# Patient Record
Sex: FEMALE | Race: BLACK OR AFRICAN AMERICAN | NOT HISPANIC OR LATINO
[De-identification: names, ages, dates, MRNs, and addresses within clinical notes are randomized per-mention and may not be internally consistent; named-entity substitution may affect disease eponyms.]

---

## 2021-04-04 ENCOUNTER — TRANSCRIPTION ENCOUNTER (OUTPATIENT)
Age: 36
End: 2021-04-04

## 2022-05-06 ENCOUNTER — TRANSCRIPTION ENCOUNTER (OUTPATIENT)
Age: 37
End: 2022-05-06

## 2022-05-07 ENCOUNTER — INPATIENT (INPATIENT)
Facility: HOSPITAL | Age: 37
LOS: 3 days | Discharge: HOME | End: 2022-05-11
Attending: OBSTETRICS & GYNECOLOGY | Admitting: OBSTETRICS & GYNECOLOGY
Payer: COMMERCIAL

## 2022-05-07 VITALS — OXYGEN SATURATION: 100 %

## 2022-05-07 DIAGNOSIS — Z34.80 ENCOUNTER FOR SUPERVISION OF OTHER NORMAL PREGNANCY, UNSPECIFIED TRIMESTER: ICD-10-CM

## 2022-05-07 DIAGNOSIS — Z3A.00 WEEKS OF GESTATION OF PREGNANCY NOT SPECIFIED: ICD-10-CM

## 2022-05-07 DIAGNOSIS — O26.899 OTHER SPECIFIED PREGNANCY RELATED CONDITIONS, UNSPECIFIED TRIMESTER: ICD-10-CM

## 2022-05-07 DIAGNOSIS — Z98.89 OTHER SPECIFIED POSTPROCEDURAL STATES: Chronic | ICD-10-CM

## 2022-05-07 DIAGNOSIS — Z98.890 OTHER SPECIFIED POSTPROCEDURAL STATES: Chronic | ICD-10-CM

## 2022-05-07 LAB
ANISOCYTOSIS BLD QL: SIGNIFICANT CHANGE UP
ANISOCYTOSIS BLD QL: SLIGHT — SIGNIFICANT CHANGE UP
BASOPHILS # BLD AUTO: 0 K/UL — SIGNIFICANT CHANGE UP (ref 0–0.2)
BASOPHILS # BLD AUTO: 0.07 K/UL — SIGNIFICANT CHANGE UP (ref 0–0.2)
BASOPHILS NFR BLD AUTO: 0 % — SIGNIFICANT CHANGE UP (ref 0–2)
BASOPHILS NFR BLD AUTO: 0.9 % — SIGNIFICANT CHANGE UP (ref 0–2)
COVID-19 SPIKE DOMAIN AB INTERP: POSITIVE
COVID-19 SPIKE DOMAIN ANTIBODY RESULT: >250 U/ML — HIGH
DACRYOCYTES BLD QL SMEAR: SLIGHT — SIGNIFICANT CHANGE UP
DACRYOCYTES BLD QL SMEAR: SLIGHT — SIGNIFICANT CHANGE UP
ELLIPTOCYTES BLD QL SMEAR: SLIGHT — SIGNIFICANT CHANGE UP
EOSINOPHIL # BLD AUTO: 0 K/UL — SIGNIFICANT CHANGE UP (ref 0–0.5)
EOSINOPHIL # BLD AUTO: 0.13 K/UL — SIGNIFICANT CHANGE UP (ref 0–0.5)
EOSINOPHIL NFR BLD AUTO: 0 % — SIGNIFICANT CHANGE UP (ref 0–6)
EOSINOPHIL NFR BLD AUTO: 1.7 % — SIGNIFICANT CHANGE UP (ref 0–6)
HCT VFR BLD CALC: 24.7 % — LOW (ref 34.5–45)
HCT VFR BLD CALC: 26.3 % — LOW (ref 34.5–45)
HGB BLD-MCNC: 7.2 G/DL — LOW (ref 11.5–15.5)
HGB BLD-MCNC: 8 G/DL — LOW (ref 11.5–15.5)
HYPOCHROMIA BLD QL: SLIGHT — SIGNIFICANT CHANGE UP
HYPOCHROMIA BLD QL: SLIGHT — SIGNIFICANT CHANGE UP
LYMPHOCYTES # BLD AUTO: 0.47 K/UL — LOW (ref 1–3.3)
LYMPHOCYTES # BLD AUTO: 0.8 K/UL — LOW (ref 1–3.3)
LYMPHOCYTES # BLD AUTO: 10.4 % — LOW (ref 13–44)
LYMPHOCYTES # BLD AUTO: 2.6 % — LOW (ref 13–44)
MACROCYTES BLD QL: SLIGHT — SIGNIFICANT CHANGE UP
MANUAL SMEAR VERIFICATION: SIGNIFICANT CHANGE UP
MANUAL SMEAR VERIFICATION: SIGNIFICANT CHANGE UP
MCHC RBC-ENTMCNC: 20.6 PG — LOW (ref 27–34)
MCHC RBC-ENTMCNC: 21.3 PG — LOW (ref 27–34)
MCHC RBC-ENTMCNC: 29.1 GM/DL — LOW (ref 32–36)
MCHC RBC-ENTMCNC: 30.4 GM/DL — LOW (ref 32–36)
MCV RBC AUTO: 70.1 FL — LOW (ref 80–100)
MCV RBC AUTO: 70.6 FL — LOW (ref 80–100)
MICROCYTES BLD QL: SIGNIFICANT CHANGE UP
MICROCYTES BLD QL: SIGNIFICANT CHANGE UP
MONOCYTES # BLD AUTO: 0.34 K/UL — SIGNIFICANT CHANGE UP (ref 0–0.9)
MONOCYTES # BLD AUTO: 0.8 K/UL — SIGNIFICANT CHANGE UP (ref 0–0.9)
MONOCYTES NFR BLD AUTO: 4.4 % — SIGNIFICANT CHANGE UP (ref 2–14)
MONOCYTES NFR BLD AUTO: 4.4 % — SIGNIFICANT CHANGE UP (ref 2–14)
MYELOCYTES NFR BLD: 1.7 % — HIGH (ref 0–0)
NEUTROPHILS # BLD AUTO: 16.81 K/UL — HIGH (ref 1.8–7.4)
NEUTROPHILS # BLD AUTO: 6.14 K/UL — SIGNIFICANT CHANGE UP (ref 1.8–7.4)
NEUTROPHILS NFR BLD AUTO: 80 % — HIGH (ref 43–77)
NEUTROPHILS NFR BLD AUTO: 90.3 % — HIGH (ref 43–77)
NEUTS BAND # BLD: 2.7 % — SIGNIFICANT CHANGE UP (ref 0–8)
NRBC # BLD: 1 /100 — HIGH (ref 0–0)
OVALOCYTES BLD QL SMEAR: SLIGHT — SIGNIFICANT CHANGE UP
PLAT MORPH BLD: ABNORMAL
PLAT MORPH BLD: NORMAL — SIGNIFICANT CHANGE UP
PLATELET # BLD AUTO: 136 K/UL — LOW (ref 150–400)
PLATELET # BLD AUTO: 142 K/UL — LOW (ref 150–400)
POIKILOCYTOSIS BLD QL AUTO: SLIGHT — SIGNIFICANT CHANGE UP
POLYCHROMASIA BLD QL SMEAR: SLIGHT — SIGNIFICANT CHANGE UP
POLYCHROMASIA BLD QL SMEAR: SLIGHT — SIGNIFICANT CHANGE UP
RBC # BLD: 3.5 M/UL — LOW (ref 3.8–5.2)
RBC # BLD: 3.75 M/UL — LOW (ref 3.8–5.2)
RBC # FLD: 17.2 % — HIGH (ref 10.3–14.5)
RBC # FLD: 17.5 % — HIGH (ref 10.3–14.5)
RBC BLD AUTO: ABNORMAL
RBC BLD AUTO: ABNORMAL
SARS-COV-2 IGG+IGM SERPL QL IA: >250 U/ML — HIGH
SARS-COV-2 IGG+IGM SERPL QL IA: POSITIVE
SARS-COV-2 RNA SPEC QL NAA+PROBE: SIGNIFICANT CHANGE UP
T PALLIDUM AB TITR SER: NEGATIVE — SIGNIFICANT CHANGE UP
TARGETS BLD QL SMEAR: SLIGHT — SIGNIFICANT CHANGE UP
VARIANT LYMPHS # BLD: 0.9 % — SIGNIFICANT CHANGE UP (ref 0–6)
WBC # BLD: 18.08 K/UL — HIGH (ref 3.8–10.5)
WBC # BLD: 7.68 K/UL — SIGNIFICANT CHANGE UP (ref 3.8–10.5)
WBC # FLD AUTO: 18.08 K/UL — HIGH (ref 3.8–10.5)
WBC # FLD AUTO: 7.68 K/UL — SIGNIFICANT CHANGE UP (ref 3.8–10.5)

## 2022-05-07 PROCEDURE — 88307 TISSUE EXAM BY PATHOLOGIST: CPT | Mod: 26

## 2022-05-07 PROCEDURE — 88302 TISSUE EXAM BY PATHOLOGIST: CPT | Mod: 26

## 2022-05-07 DEVICE — SURGICEL POWDER 3 GRAMS: Type: IMPLANTABLE DEVICE | Status: FUNCTIONAL

## 2022-05-07 RX ORDER — OXYTOCIN 10 UNIT/ML
4 VIAL (ML) INJECTION
Qty: 30 | Refills: 0 | Status: DISCONTINUED | OUTPATIENT
Start: 2022-05-07 | End: 2022-05-07

## 2022-05-07 RX ORDER — OXYCODONE HYDROCHLORIDE 5 MG/1
5 TABLET ORAL ONCE
Refills: 0 | Status: DISCONTINUED | OUTPATIENT
Start: 2022-05-07 | End: 2022-05-11

## 2022-05-07 RX ORDER — OXYTOCIN 10 UNIT/ML
333.33 VIAL (ML) INJECTION
Qty: 20 | Refills: 0 | Status: DISCONTINUED | OUTPATIENT
Start: 2022-05-07 | End: 2022-05-11

## 2022-05-07 RX ORDER — LANOLIN
1 OINTMENT (GRAM) TOPICAL EVERY 6 HOURS
Refills: 0 | Status: DISCONTINUED | OUTPATIENT
Start: 2022-05-07 | End: 2022-05-11

## 2022-05-07 RX ORDER — SIMETHICONE 80 MG/1
80 TABLET, CHEWABLE ORAL EVERY 4 HOURS
Refills: 0 | Status: DISCONTINUED | OUTPATIENT
Start: 2022-05-07 | End: 2022-05-11

## 2022-05-07 RX ORDER — TRANEXAMIC ACID 100 MG/ML
1000 INJECTION, SOLUTION INTRAVENOUS ONCE
Refills: 0 | Status: COMPLETED | OUTPATIENT
Start: 2022-05-07 | End: 2022-05-07

## 2022-05-07 RX ORDER — SODIUM CHLORIDE 9 MG/ML
500 INJECTION INTRAMUSCULAR; INTRAVENOUS; SUBCUTANEOUS ONCE
Refills: 0 | Status: DISCONTINUED | OUTPATIENT
Start: 2022-05-07 | End: 2022-05-11

## 2022-05-07 RX ORDER — ONDANSETRON 8 MG/1
4 TABLET, FILM COATED ORAL EVERY 6 HOURS
Refills: 0 | Status: DISCONTINUED | OUTPATIENT
Start: 2022-05-07 | End: 2022-05-11

## 2022-05-07 RX ORDER — SODIUM CHLORIDE 9 MG/ML
1000 INJECTION, SOLUTION INTRAVENOUS
Refills: 0 | Status: DISCONTINUED | OUTPATIENT
Start: 2022-05-07 | End: 2022-05-07

## 2022-05-07 RX ORDER — DEXAMETHASONE 0.5 MG/5ML
4 ELIXIR ORAL EVERY 6 HOURS
Refills: 0 | Status: DISCONTINUED | OUTPATIENT
Start: 2022-05-07 | End: 2022-05-11

## 2022-05-07 RX ORDER — NALOXONE HYDROCHLORIDE 4 MG/.1ML
0.1 SPRAY NASAL
Refills: 0 | Status: DISCONTINUED | OUTPATIENT
Start: 2022-05-07 | End: 2022-05-11

## 2022-05-07 RX ORDER — HEPARIN SODIUM 5000 [USP'U]/ML
5000 INJECTION INTRAVENOUS; SUBCUTANEOUS EVERY 12 HOURS
Refills: 0 | Status: DISCONTINUED | OUTPATIENT
Start: 2022-05-07 | End: 2022-05-11

## 2022-05-07 RX ORDER — MORPHINE SULFATE 50 MG/1
2 CAPSULE, EXTENDED RELEASE ORAL ONCE
Refills: 0 | Status: DISCONTINUED | OUTPATIENT
Start: 2022-05-07 | End: 2022-05-08

## 2022-05-07 RX ORDER — SODIUM CHLORIDE 9 MG/ML
1000 INJECTION INTRAMUSCULAR; INTRAVENOUS; SUBCUTANEOUS
Refills: 0 | Status: DISCONTINUED | OUTPATIENT
Start: 2022-05-07 | End: 2022-05-11

## 2022-05-07 RX ORDER — ACETAMINOPHEN 500 MG
1000 TABLET ORAL EVERY 6 HOURS
Refills: 0 | Status: COMPLETED | OUTPATIENT
Start: 2022-05-07 | End: 2022-05-08

## 2022-05-07 RX ORDER — ACETAMINOPHEN 500 MG
975 TABLET ORAL EVERY 6 HOURS
Refills: 0 | Status: COMPLETED | OUTPATIENT
Start: 2022-05-07 | End: 2023-04-05

## 2022-05-07 RX ORDER — TETANUS TOXOID, REDUCED DIPHTHERIA TOXOID AND ACELLULAR PERTUSSIS VACCINE, ADSORBED 5; 2.5; 8; 8; 2.5 [IU]/.5ML; [IU]/.5ML; UG/.5ML; UG/.5ML; UG/.5ML
0.5 SUSPENSION INTRAMUSCULAR ONCE
Refills: 0 | Status: DISCONTINUED | OUTPATIENT
Start: 2022-05-07 | End: 2022-05-11

## 2022-05-07 RX ORDER — CITRIC ACID/SODIUM CITRATE 300-500 MG
15 SOLUTION, ORAL ORAL EVERY 6 HOURS
Refills: 0 | Status: DISCONTINUED | OUTPATIENT
Start: 2022-05-07 | End: 2022-05-07

## 2022-05-07 RX ORDER — OXYCODONE HYDROCHLORIDE 5 MG/1
5 TABLET ORAL
Refills: 0 | Status: DISCONTINUED | OUTPATIENT
Start: 2022-05-07 | End: 2022-05-08

## 2022-05-07 RX ORDER — DIPHENHYDRAMINE HCL 50 MG
25 CAPSULE ORAL EVERY 6 HOURS
Refills: 0 | Status: DISCONTINUED | OUTPATIENT
Start: 2022-05-07 | End: 2022-05-11

## 2022-05-07 RX ORDER — OXYCODONE HYDROCHLORIDE 5 MG/1
5 TABLET ORAL ONCE
Refills: 0 | Status: DISCONTINUED | OUTPATIENT
Start: 2022-05-07 | End: 2022-05-08

## 2022-05-07 RX ORDER — SODIUM CHLORIDE 9 MG/ML
1000 INJECTION, SOLUTION INTRAVENOUS
Refills: 0 | Status: DISCONTINUED | OUTPATIENT
Start: 2022-05-07 | End: 2022-05-11

## 2022-05-07 RX ORDER — OXYCODONE HYDROCHLORIDE 5 MG/1
5 TABLET ORAL
Refills: 0 | Status: COMPLETED | OUTPATIENT
Start: 2022-05-07 | End: 2022-05-14

## 2022-05-07 RX ORDER — MAGNESIUM HYDROXIDE 400 MG/1
30 TABLET, CHEWABLE ORAL
Refills: 0 | Status: DISCONTINUED | OUTPATIENT
Start: 2022-05-07 | End: 2022-05-11

## 2022-05-07 RX ADMIN — OXYCODONE HYDROCHLORIDE 5 MILLIGRAM(S): 5 TABLET ORAL at 17:28

## 2022-05-07 RX ADMIN — Medication 400 MILLIGRAM(S): at 14:18

## 2022-05-07 RX ADMIN — Medication 1000 MILLIUNIT(S)/MIN: at 14:32

## 2022-05-07 RX ADMIN — SODIUM CHLORIDE 125 MILLILITER(S): 9 INJECTION, SOLUTION INTRAVENOUS at 07:15

## 2022-05-07 RX ADMIN — TRANEXAMIC ACID 220 MILLIGRAM(S): 100 INJECTION, SOLUTION INTRAVENOUS at 13:45

## 2022-05-07 RX ADMIN — Medication 4 MILLIUNIT(S)/MIN: at 08:55

## 2022-05-07 RX ADMIN — SODIUM CHLORIDE 125 MILLILITER(S): 9 INJECTION, SOLUTION INTRAVENOUS at 05:59

## 2022-05-07 RX ADMIN — ONDANSETRON 4 MILLIGRAM(S): 8 TABLET, FILM COATED ORAL at 23:10

## 2022-05-07 RX ADMIN — Medication 15 MILLILITER(S): at 06:50

## 2022-05-07 NOTE — OB RN PREOPERATIVE CHECKLIST - NS_PREOPCHKPREADMCARBDRINK_OBGYN_ALL_OB
Reviewed recent EKG 7/9/19 with Dr. Estrada along with cardiac clearance from Dr. Ellis 6/3/19. No new orders needed.   No

## 2022-05-07 NOTE — OB PROVIDER TRIAGE NOTE - HISTORY OF PRESENT ILLNESS
HPI: Pt is a 36yo   presenting for possible labor. Patient states that she started feeling contractions at 1AM, now occurring every 3 minutes. She denies loss of fluid or vaginal bleeding. Endorses good fetal movement. Denies any complications with this pregnancy. GBS negative. EFW 5lb3oz 1 week ago.     OBHx:  -  term C/S (unknown indication), birth weight 8lb6oz  - 2016 term , birth weight 8lb1oz  Gyn Hx: asymptomatic fibroids. Denies ovarian cysts, STDs, abnormal paps.   PMH: none  SHx: none other than C/S  Psych: no h/o depression/anxiety  Social: no tobacco, alcohol, drugs in pregnancy   Medications: none  Allergies: aspirin (anaphylaxis)  Will Accept blood transfusion? Yes    Vital Signs Last 24 Hrs  T(C): 37.2 (07 May 2022 04:35), Max: 37.2 (07 May 2022 04:25)  T(F): 99 (07 May 2022 04:35), Max: 99 (07 May 2022 04:35)  HR: 80 (07 May 2022 04:53) (76 - 101)  BP: 115/66 (07 May 2022 04:35) (115/66 - 115/66)  BP(mean): --  RR: 18 (07 May 2022 04:35) (18 - 18)  SpO2: 100% (07 May 2022 04:53) (100% - 100%)    Physical Exam:  Gen: NAD, AOx3  CV: RR  Resp: unlabored respirations  Abd: soft, NT, ND    A/P: Pt is a 36yo  presenting with labor. No prenatal issues. GBS negative.  1. Admit to LND. Routine Labs. IVF  2. Expectant Management  3. Fetus: Cat 1 tracing, Vertex.  4. Pain: IV pain meds/epidural PRN

## 2022-05-07 NOTE — OB RN DELIVERY SUMMARY - NS_SEPSISRSKCALC_OBGYN_ALL_OB_FT
EOS calculated successfully. EOS Risk Factor: 0.5/1000 live births ([facility] national incidence); GA=37w4d; Temp=99; ROM=5.667; GBS='Negative'; Antibiotics='No antibiotics or any antibiotics < 2 hrs prior to birth'

## 2022-05-07 NOTE — OB PROVIDER LABOR PROGRESS NOTE - ASSESSMENT
36 yo  @37+4 in labor for TOLAC    - SVE /-2  - FHT Cat 1, reassuring, ctm  - ISE placed  - c/w pitocin    d/w Dr. Jennifer Farrell, PGY1
38yo  @ 37w 4d  admitted in labor for TOLAC  - fetal status - pt with cat 2 NST, pitocin discontinued, change position, continue amnioinfusion  - GBS neg  - Labor management - pt still 5 cm- no cervical change, Pitocin discontinued secondary to FHT  - Dr Rodriguez called- if persistent cat 2 FHT without cervical change, plan will be to proceed with c/s  - Elevated BP's will send HELLP labs and urine  Pt seen with Dr Johnson who agrees with above assessment and plan    Lluvia Green PA-C
38yo  @37+4 IOL in labor for TOLAC with variables    - SVE: /-2  - IUPC placed  - FHT Cat 2, overall reassuring, ctm  - c/w pitocin    d/w Dr. Mook Farrell, PGY1
36 yo  @37+4 for TOLAC, prior  s/p SROM w/ clear fluid    - /-2, unchanged  - No contractions, will start pitocin  - FHT Cat 2, reassuring, ctm  - Expect     d/w Dr. Jennifer Farrell, PGY1

## 2022-05-07 NOTE — OB PROVIDER H&P - NSHPPHYSICALEXAM_GEN_ALL_CORE
PE:    T(C): 37.2 (05-07-22 @ 04:59), Max: 37.2 (05-07-22 @ 04:25)  HR: 75 (05-07-22 @ 05:13) (75 - 101)  BP: 115/66 (05-07-22 @ 04:59) (115/66 - 115/66)  RR: 18 (05-07-22 @ 04:59) (18 - 18)  SpO2: 100% (05-07-22 @ 05:13) (100% - 100%)    General: NAD, A&Ox3  CV: RRR  Lungs: Clear bilat   Abd: soft, nontender, gravid  VE: 4.5/60/-3  Bedside sono: vertex  EFM: 140/moderate variablity/+accels/-decels  TOCO: q3-4mins

## 2022-05-07 NOTE — OB RN INTRAOPERATIVE NOTE - NSSELHIDDEN_OBGYN_ALL_OB_FT
[NS_DeliveryAttending1_OBGYN_ALL_OB_FT:[id number]],[NS_DeliveryAttending2_OBGYN_ALL_OB_FT:[id number]],[NS_DeliveryAssist1_OBGYN_ALL_OB_FT:[id number]] [NS_DeliveryAttending1_OBGYN_ALL_OB_FT:[id number]],[NS_DeliveryAttending2_OBGYN_ALL_OB_FT:[id number]],[NS_DeliveryAssist1_OBGYN_ALL_OB_FT:[id number]],[NS_DeliveryRN_OBGYN_ALL_OB_FT:[id number]] [NS_DeliveryAttending1_OBGYN_ALL_OB_FT:[id number]],[NS_DeliveryAttending2_OBGYN_ALL_OB_FT:[id number]],[NS_DeliveryAssist1_OBGYN_ALL_OB_FT:[id number]=],[NS_DeliveryRN_OBGYN_ALL_OB_FT:[id number]==]

## 2022-05-07 NOTE — OB RN INTRAOPERATIVE NOTE - NS_DELIVERYASSIST1_OBGYN_ALL_OB_FT
-- DO NOT REPLY / DO NOT REPLY ALL --  -- Message is from the [facility]--    COVID-19 Universal Screening: N/A - Not about scheduling    General Patient Message      Reason for Call: Rabia would like a copy of the mammogram order from January faxed over to [facility] at [phone]. Please include all of Rabia's information on the order so that she can call and schedule her appointment.     Caller Information       Type Contact Phone    10/20/2020 10:58 AM CDT Phone (Incoming) Rabia Hagan (Self) [phone] (H)          Alternative phone number: None    Turnaround time given to caller:   \"This message will be sent to [state Provider's name]. The clinical team will fulfill your request as soon as they review your message.\"    
Faxed info as requested.  
Cora Dupont MD

## 2022-05-07 NOTE — OB PROVIDER LABOR PROGRESS NOTE - NS_SUBJECTIVE/OBJECTIVE_OBGYN_ALL_OB_FT
Called by RN who reported that patient SROM'ed at 8am. Patient is comfortable at bedside and heard a "pop" of fluid.
Difficult to trace patient on external FHT monitor. Patient otherwise asymptomatic. Pit @4u
Patient started having variable decelerations.
Pt comfortable with epidural    T(C): 36.6 (05-07-22 @ 07:10), Max: 37.2 (05-07-22 @ 04:25)  HR: 73 (05-07-22 @ 12:55) (58 - 101)  BP: 154/90 (05-07-22 @ 12:31) (108/76 - 154/90)  RR: 18 (05-07-22 @ 07:10) (18 - 18)  SpO2: 90% (05-07-22 @ 12:55) (87% - 100%)

## 2022-05-07 NOTE — OB RN DELIVERY SUMMARY - NSSELHIDDEN_OBGYN_ALL_OB_FT
[NS_DeliveryAttending1_OBGYN_ALL_OB_FT:[id number]],[NS_DeliveryAttending2_OBGYN_ALL_OB_FT:[id number]=],[NS_DeliveryAssist1_OBGYN_ALL_OB_FT:[id number]=],[NS_DeliveryRN_OBGYN_ALL_OB_FT:[id number]==]

## 2022-05-07 NOTE — OB NEONATOLOGY/PEDIATRICIAN DELIVERY SUMMARY - NSPEDSNEONOTESA_OBGYN_ALL_OB_FT
Baby is a ___ week GA ___ born to a ___ y/o G__P__ mother via . Maternal history uncomplicated. Pregnancy uncomplicated. Maternal blood type ___. Prenatal labs negative, nonreactive and immune. GBS ___ on ___. ROM <18hrs with ____ fluid. Baby born vigorous and crying spontaneously. Warmed, dried, stimulated. EOS __. Apgars ___ / ___. Breast/bottle feeding. Wants hepB (and circ). Baby is a 37.4 week GA F born to a 36 y/o  mother via repeat C/S. Maternal history significant for fibroids, D&C for heavy menstruation. Pregnancy uncomplicated. Maternal blood type O+. Prenatal labs negative, nonreactive and immune. GBS negative on . SROM <18hrs with clear fluid. Baby born vigorous and crying spontaneously. Warmed, dried, stimulated. EOS 0.08. Apgars 8 / 9. Breast feeding. Wants hepB.

## 2022-05-07 NOTE — OB PROVIDER LABOR PROGRESS NOTE - NS_OBIHIFHRDETAILS_OBGYN_ALL_OB_FT
Cat 1: 140/mod variability/ - accels/ - decels
135 moderate variability + accels + variable decels with contractions
Cat 2: 130/ mod variability/- accels/ intermittent variables
FHT Cat 2: 130/mod variability/ + accels/ intermittent variables

## 2022-05-07 NOTE — OB PROVIDER DELIVERY SUMMARY - NSSELHIDDEN_OBGYN_ALL_OB_FT
[NS_DeliveryAttending1_OBGYN_ALL_OB_FT:[id number]],[NS_DeliveryAttending2_OBGYN_ALL_OB_FT:[id number]=],[NS_DeliveryAssist1_OBGYN_ALL_OB_FT:[id number]=]

## 2022-05-07 NOTE — CHART NOTE - NSCHARTNOTEFT_GEN_A_CORE
Note    Called to bedside for pt with variable decelerations.  Initially started as intermittent and amnioinfusion was started but now more frequent.  Pt is a 38 yo P2  @ 37.4 wks admitted for SROM for TOLAC #2 on Pitocin.  Rpt VE - 5/80/-3 - no change since last exam and minimal change since admission.  FHT-baseline 135, moderate variability, variable decels to 80's with recovery to baseline, no accels  IUPC-q 2-3 min    -Intrauterine resuscitation in progress, pt to [facility], Pitocin off, amnioinfusion in progress  -In context of increasing variable decels with TOLAC and no cervical change with Pitocin now off, decision made for rpt c/s.  Consent signed by private OB.  d/w pt and pt understands decision.  Private OB aware and enroute.    BRANDON Johnson

## 2022-05-07 NOTE — OB PROVIDER DELIVERY SUMMARY - NSPROVIDERDELIVERYNOTE_OBGYN_ALL_OB_FT
viable infant, cephalic presentation, body cord x1, weight 2770g, APGARS 8/9  enlarged, bulky uterus, grossly normal b/l tubes and ovaries  hysterotomy closed in 1 layer with Caprosyn suture  TXA and extra 20u Pitocin administered for uterine atony  Retrograde instillation of 150cc of methylene blue with no extravasation of blue dye  Re-dosed antibiotics due to EBL 1500    1500/1650/150    Dictation #: viable infant, cephalic presentation, body cord x1, weight 2770g, APGARS 8/9  enlarged, bulky uterus, grossly normal b/l tubes and ovaries  hysterotomy closed in 1 layer with Caprosyn suture  TXA and extra 20u Pitocin administered for uterine atony  Retrograde instillation of 150cc of methylene blue with no extravasation of blue dye  Bilateral salpingectomy performed using the Ligasure device  Re-dosed antibiotics due to EBL 1500    1500/1650/150    Dictation #: viable infant, cephalic presentation, body cord x1, weight 2770g, APGARS 8/9  enlarged, bulky uterus, grossly normal b/l tubes and ovaries  hysterotomy closed in 1 layer with Caprosyn suture  TXA and extra 20u Pitocin administered for uterine atony  Retrograde instillation of 150cc of methylene blue with no extravasation of blue dye  Surgicel powder applied to hysterotomy  Bilateral salpingectomy performed using the Ligasure device  Re-dosed antibiotics due to EBL 1500    1500/1650/150    Dictation #: [id number]

## 2022-05-07 NOTE — OB RN PATIENT PROFILE - FALL HARM RISK - UNIVERSAL INTERVENTIONS
Bed in lowest position, wheels locked, appropriate side rails in place/Call bell, personal items and telephone in reach/Instruct patient to call for assistance before getting out of bed or chair/Non-slip footwear when patient is out of bed/[place] to call system/Physically safe environment - no spills, clutter or unnecessary equipment/Purposeful Proactive Rounding/Room/bathroom lighting operational, light cord in reach

## 2022-05-07 NOTE — OB RN TRIAGE NOTE - COMFORT/ACCEPTABLE PAIN LEVEL (0-10)
Occupational Therapy Progress Note       Visit Count: 25  Plan of Care Dates: Initial: 6/6/2018 Through: 8/1/2018  NEW POC  8/2/18 to 10/31/18   Insurance Information: Work Injury Information:   Current Employer: [employer]   :    Restrictions: Off work   Full Duty Work Demands: light (10 - 20 lbs), medium (20 - 50 lbs), sitting >50% of the day, standing >50% of the day, computer/office work, lifting overhead, chest height lifting, lifting from floor, rotational/twisting motions  and needs to cut metal with metal scissors  Work 8-12 hours days.    Current Work Status: full time occupation: ; off work due to current condition  Claim Number: [id number]  Claim Status: claim open and in good standing     Next Referring Provider Visit: 9/5/18     Referred by: Damien Conway PA-C  Medical Diagnosis (from order): 816.01 (ICD-9-CM) - S62.646A (ICD-10-CM) - Closed nondisplaced fracture of proximal phalanx of right little finger  Treatment Diagnosis: Wrist/Hand Symptoms with Pain, Impaired Joint Mobility, Impaired Range of Motion, Impaired Motor Function/Muscle Performance, Impaired Motor Function and Sensory Integration and Movement Coordination Impairments  Insurance: 1. WC-KARINE  2. N/A     Date of onset/injury: 5/9/18  Diagnosis Precautions: splint, skin graft.  Fx prox phalanx D5   Chart reviewed: Relevant co-morbidities, allergies, tests and medications:       MD orders  Evaluate & Treat  Therapist discretion, splinting, ulnar gutter hand based dip free.  Edema management, wound care/scar management and AROM/AAROM/PROM  Modalities: Therapist discretion       SUBJECTIVE   Less hypersensitivity over ulnar wrist area. Concerned about D5 to following other fingers during whole grasp tasks  Current Pain: 0/10.    Functional Change: feels D5 is bending better.  Using putty.      OBJECTIVE   eval 6/13/18 6/13/18  DPC to tip of digit  D2   3 cm  D3   4.1 cm  D4   5.5  cm    PN: 8/22/18    Range of Motion (degrees):     Left Right Right Right    Date Initial  Initial  7/20/18 8/22/18 9/4/18   Index Finger           MCP Ext/flex   32   P75 72 72 80    PIP Ext/Flex   75   P95 95 95 95    DIP Ext/Flex   40   P55 55 65 60    DPC distance          Middle Finger           MCP Ext/Flex   20   P65 70 75 80    PIP Ext/Flex   66    P105 100 100 100    DIP Ext/Flex   26    P55 77 75 75    DPC distance          Ring Finger           MCP Ext/Flex   22   P45 50 60 66    PIP Ext/Flex   21   P60 85 85 97    DIP Ext/Flex   15    P55 70 90 75    DPC distance          Small Finger           MCP Ext/Flex   20 47 60 60    PIP Ext/Flex   20 44 70 70    DIP Ext/Flex   15 40 75 -18 - 60    DPC distance          Thumb           MCP Ext/Flex   45 57 67 65    IP Ext/Flex   30 65 74 70    Radial abduction   35 40 75     Palmar abduction   32 35 62     Tip Opposition   D2 only D4 radial side of distal phalanx All digits     Opposition to      Base of 5th digit   NT  1.0 cm    Key: Ext=extension, Flex=flexion, SF=small finger, MCP=metacarpophalangeal joint, PIP=proximal interphalangeal joint, DIP=distal interphalangeal joints; IP=interphalangeal joint; DPC =distal palmar crease, IP=interphalangeal joint ; standard testing positions unless otherwise noted; ranges are reported in active range of motion unless noted as AA=active assistive or P=passive range of motion; * denotes pain   Comments:  ; *denotes pain    Range of Motion (degrees) Norm Left Right Right Right    Date   Initial Initial 7/20/18 8/22/18 9/4/18   Wrist Flexion 80 65 20 35 35 48   Wrist Extension 70 74 30 70 73 72   Radial Deviation 20 23 9 13 15 15   Ulnar Deviation 45 40 22 25 35 35   standard testing positions unless otherwise noted; Key: ranges are reported in active range of motion unless noted as AA=active assistive or P=passive range of motion, * denotes pain   Comments: Only those motions that were assessed are noted.    Strength:  /Pinch   [] Gross muscle strength within functional/normal limits except as noted.  [] Only muscle strength that was assessed are noted.  [] Uninvolved muscle strength within functional/normal limits.  /Pinch (pounds of force) Left Right    Date Initial Initial 9/4/18              Trial 1/2/3 26 18 14/15/15        Average   14.6   Lateral Pinch           Trial 1/2/3 8 4 6        Average      3 Point Pinch           Trial 1/2/3 6 2 4        Average      Tip Pinch           Trial 1/2/3 6 2 3        Average      standard testing positions unless otherwise noted,* denotes pain  Comments: Only muscle strength that was assessed are noted.  Norms:  : Age: 20-29: male: left 82.7-126.7, right 100.4-143.8; female: left 47.9-75.7, right 55.9-88.4   Key/lateral pinch: Age: 25-29: male: left 25.0+/-4.4, right 26.7+/-4.9; female: left 16.6+/-2.1, right 17.7+/-2.1  Palmar/3 point pinch: Age: 25-29: male: left 25.1+/-4.2, right 26.0+/-4.3; female: left 17.0+/-3.0, right 17.7+/-3.2  Tip pinch: Age: 25-29: male: left 17.5+/-5.2, right 18.3+/-4.4; female: left 11.3+/-1.8, right 11.9+/-1.8                 Treatment   Therapeutic Exercise:   · P/AA/AROM wrist and digits - fingers and thumb- all planes.    · Place and hold in digit flexion / ext  · Isolated MP extension  · Using juancho strap on D4 /D5 to increase AROM of D5 inconsistently  · Wrist flex and ext stretches, prayer stretch - Instructed to hold for 15 sec.  Reports that she was holding for 10 minutes  · Sensitization bins - searching for marbles in cotton ball and popcorn bin.  Encouraged using D5 during whole hand grasping.    Orthotic Fit and Train  · Fabricated gutter splint to position D5 into full extension of PIP and DIP joint to wear during the day.  Pt tolerated approximately 2-3 minutes in clinic.        Manual Therapy:   · Wound is healed.  Dry and flaking  skin present at ulnar wrist region.  Lotion applied for improving skin integrity.    · Massage  over and around skin graft area.  increase skin elasticity.  · Used dycem around graft area to decrease adhesions.  Has piece to use at home.     Ultrasound (45189):    Patient has been made aware of potential contraindications and possible risks associated with the use of modality and has agreed.  Location: volar D4 and D5  Position: sitting  Duration: 8 minutes Duty Cycle: 100% duty cycle  Frequency: 3 Mhz  Intensity: 1.0 W/cm2   Results: improved range of motion and improved circulation; no adverse reaction to treatment     Fluidotherapy (54265):  HOLD today 9/18/18  Patient has been made aware of potential contraindications and possible risks associated with the use of modality and has agreed.  Prepared area per protocol.    Air speed: 50% Temperature: 98° F   Position: sitting Duration::15 minutes   Results: improved range of motion and improved circulation; no adverse reaction to treatment     Current Home Program (not performed this date except as noted above):   Access Code: [email or web address]   URL: [email or web address]   Date: 06/13/2018   Prepared by: Bebo Parker     Exercises   Wrist Extension AROM - 5 reps - 3 sets - 3-5 hold   Seated Finger Composite Flexion Extension - 5 reps - 3 sets - 3-5 hold   Wrist Tendon Gliding - 5 reps - 3 sets - 3-5 hold   Finger O - 5 reps - 3 sets - 3-5 hold   Red Putty - gripping and pinching  Wrist and forearm PREs      ASSESSMENT   AROM is improving in D5.   Fabricated D5 Full extension to wear at night to provide stretch to flexor tendons in D5.  Scar adhesions over volar D5 and skin graft are limiting her AROM of D5  Pt would benefit from continued skilled Occupational Therapy services as per POC.   Recommend 2x weekly for 8 weeks.  Pain after treatment: 0/10  Result of above outlined education: Verbalizes understanding and Demonstrates understanding    Goals:       To be obtained by end of this plan of care: Updated: 9/4/18  1. Patient independent with modified  and progressed home exercise program. Progressing  2. Decrease involved right hand pain to 1/10 pain, for resumption of self-cares with affected hand and eliminate need for pain medication use. Progressing  3. Achieve active fingertip to palm composite flexion for resumption of small object grasp and hold, ½ inch diameter tool use (eating utensil, toothbrush) and wring out wash cloth. MET  4. Achieve active finger extension sufficient to reach into pants pocket, wipe flat surface or face. MET  5. Achieve pinch / dexterity sufficient to  coins, button, tie shoes, pull zipper, type. Progressing  6. Achieve  and pinch strength to WFL for resumption of light grocery bag lift,  steering wheel, perform light home/yard maintenance tasks. Not assessed yet  7. Patient/Client will be able to lift up to 50 pounds from floor to waist with proper body mechanics to assist with lifting activities at work. Not addressed at this time Not assessed yet  8. Patient/Client will demonstrate  strength of wfl to cut metal with scissors at work.Progress with , but not able to use force to cut Not achieved yet    PLAN   US to D4/5 while stretching flexor tendons. Edema management, wound care/scar management and AROM/AAROM/PROM.  Check wrist PREs and splint tolerance.      THERAPY DAILY BILLING   Primary Insurance:  [facility]  Secondary Insurance: N/A    Evaluation Procedures:  No evaluation codes were used on this date of service    Timed Procedures:  Manual Therapy, 10 minutes  Therapeutic Exercise, 15 minutes  Ultrasound, 8 minutes  Orthotics Training, 15 minutes    Untimed Procedures:  No untimed codes were used on this date of service    Total Treatment Time: 48 minutes       5

## 2022-05-07 NOTE — OB PROVIDER LABOR PROGRESS NOTE - NS_STATION_OBGYN_ALL_OB_NU
-2 Ed holding BETHANIE Hess covering for BETHANIE Victor Ed holding BETHANIE Hess covering for BETHANIE Lu

## 2022-05-07 NOTE — OB PROVIDER H&P - NSINFECTIONS_OBGYN_ALL_OB
Unknown at this time
I have personally seen and examined this patient.  I have fully participated in the care of this patient. I have reviewed all pertinent clinical information, including history, physical exam, plan and the Resident’s note and agree except as noted.

## 2022-05-08 DIAGNOSIS — Z98.891 HISTORY OF UTERINE SCAR FROM PREVIOUS SURGERY: ICD-10-CM

## 2022-05-08 LAB
ALBUMIN SERPL ELPH-MCNC: 2.9 G/DL — LOW (ref 3.3–5)
ALP SERPL-CCNC: 103 U/L — SIGNIFICANT CHANGE UP (ref 40–120)
ALT FLD-CCNC: 16 U/L — SIGNIFICANT CHANGE UP (ref 10–45)
ANION GAP SERPL CALC-SCNC: 12 MMOL/L — SIGNIFICANT CHANGE UP (ref 5–17)
APPEARANCE UR: CLEAR — SIGNIFICANT CHANGE UP
APTT BLD: 29.2 SEC — SIGNIFICANT CHANGE UP (ref 27.5–35.5)
AST SERPL-CCNC: 54 U/L — HIGH (ref 10–40)
BILIRUB SERPL-MCNC: 0.3 MG/DL — SIGNIFICANT CHANGE UP (ref 0.2–1.2)
BILIRUB UR-MCNC: NEGATIVE — SIGNIFICANT CHANGE UP
BUN SERPL-MCNC: 4 MG/DL — LOW (ref 7–23)
CALCIUM SERPL-MCNC: 8.9 MG/DL — SIGNIFICANT CHANGE UP (ref 8.4–10.5)
CHLORIDE SERPL-SCNC: 103 MMOL/L — SIGNIFICANT CHANGE UP (ref 96–108)
CO2 SERPL-SCNC: 23 MMOL/L — SIGNIFICANT CHANGE UP (ref 22–31)
COLOR SPEC: SIGNIFICANT CHANGE UP
CREAT SERPL-MCNC: 0.71 MG/DL — SIGNIFICANT CHANGE UP (ref 0.5–1.3)
DIFF PNL FLD: ABNORMAL
EGFR: 113 ML/MIN/1.73M2 — SIGNIFICANT CHANGE UP
GLUCOSE SERPL-MCNC: 95 MG/DL — SIGNIFICANT CHANGE UP (ref 70–99)
GLUCOSE UR QL: NEGATIVE — SIGNIFICANT CHANGE UP
HCT VFR BLD CALC: 19.2 % — CRITICAL LOW (ref 34.5–45)
HCT VFR BLD CALC: 19.8 % — CRITICAL LOW (ref 34.5–45)
HCT VFR BLD CALC: 21 % — CRITICAL LOW (ref 34.5–45)
HGB BLD-MCNC: 5.8 G/DL — CRITICAL LOW (ref 11.5–15.5)
HGB BLD-MCNC: 5.9 G/DL — CRITICAL LOW (ref 11.5–15.5)
HGB BLD-MCNC: 6.2 G/DL — CRITICAL LOW (ref 11.5–15.5)
INR BLD: 1.05 RATIO — SIGNIFICANT CHANGE UP (ref 0.88–1.16)
KETONES UR-MCNC: NEGATIVE — SIGNIFICANT CHANGE UP
LACTATE SERPL-SCNC: 1.1 MMOL/L — SIGNIFICANT CHANGE UP (ref 0.7–2)
LEUKOCYTE ESTERASE UR-ACNC: ABNORMAL
MCHC RBC-ENTMCNC: 20.8 PG — LOW (ref 27–34)
MCHC RBC-ENTMCNC: 21.1 PG — LOW (ref 27–34)
MCHC RBC-ENTMCNC: 21.2 PG — LOW (ref 27–34)
MCHC RBC-ENTMCNC: 29.5 GM/DL — LOW (ref 32–36)
MCHC RBC-ENTMCNC: 29.8 GM/DL — LOW (ref 32–36)
MCHC RBC-ENTMCNC: 30.2 GM/DL — LOW (ref 32–36)
MCV RBC AUTO: 70.3 FL — LOW (ref 80–100)
MCV RBC AUTO: 70.5 FL — LOW (ref 80–100)
MCV RBC AUTO: 70.7 FL — LOW (ref 80–100)
NITRITE UR-MCNC: NEGATIVE — SIGNIFICANT CHANGE UP
NRBC # BLD: 0 /100 WBCS — SIGNIFICANT CHANGE UP (ref 0–0)
PH UR: 6 — SIGNIFICANT CHANGE UP (ref 5–8)
PLATELET # BLD AUTO: 129 K/UL — LOW (ref 150–400)
PLATELET # BLD AUTO: 129 K/UL — LOW (ref 150–400)
PLATELET # BLD AUTO: 131 K/UL — LOW (ref 150–400)
POTASSIUM SERPL-MCNC: 3.3 MMOL/L — LOW (ref 3.5–5.3)
POTASSIUM SERPL-SCNC: 3.3 MMOL/L — LOW (ref 3.5–5.3)
PROT SERPL-MCNC: 5.3 G/DL — LOW (ref 6–8.3)
PROT UR-MCNC: NEGATIVE — SIGNIFICANT CHANGE UP
PROTHROM AB SERPL-ACNC: 12.1 SEC — SIGNIFICANT CHANGE UP (ref 10.5–13.4)
RBC # BLD: 2.73 M/UL — LOW (ref 3.8–5.2)
RBC # BLD: 2.8 M/UL — LOW (ref 3.8–5.2)
RBC # BLD: 2.98 M/UL — LOW (ref 3.8–5.2)
RBC # FLD: 17.3 % — HIGH (ref 10.3–14.5)
RBC # FLD: 17.5 % — HIGH (ref 10.3–14.5)
RBC # FLD: 17.7 % — HIGH (ref 10.3–14.5)
SODIUM SERPL-SCNC: 138 MMOL/L — SIGNIFICANT CHANGE UP (ref 135–145)
SP GR SPEC: 1 — LOW (ref 1.01–1.02)
UROBILINOGEN FLD QL: NEGATIVE — SIGNIFICANT CHANGE UP
WBC # BLD: 13.78 K/UL — HIGH (ref 3.8–10.5)
WBC # BLD: 15.18 K/UL — HIGH (ref 3.8–10.5)
WBC # BLD: 17.01 K/UL — HIGH (ref 3.8–10.5)
WBC # FLD AUTO: 13.78 K/UL — HIGH (ref 3.8–10.5)
WBC # FLD AUTO: 15.18 K/UL — HIGH (ref 3.8–10.5)
WBC # FLD AUTO: 17.01 K/UL — HIGH (ref 3.8–10.5)

## 2022-05-08 RX ORDER — POTASSIUM CHLORIDE 20 MEQ
40 PACKET (EA) ORAL EVERY 4 HOURS
Refills: 0 | Status: COMPLETED | OUTPATIENT
Start: 2022-05-08 | End: 2022-05-09

## 2022-05-08 RX ORDER — FERROUS SULFATE 325(65) MG
325 TABLET ORAL DAILY
Refills: 0 | Status: DISCONTINUED | OUTPATIENT
Start: 2022-05-08 | End: 2022-05-11

## 2022-05-08 RX ORDER — ASCORBIC ACID 60 MG
500 TABLET,CHEWABLE ORAL DAILY
Refills: 0 | Status: DISCONTINUED | OUTPATIENT
Start: 2022-05-08 | End: 2022-05-11

## 2022-05-08 RX ORDER — ERTAPENEM SODIUM 1 G/1
INJECTION, POWDER, LYOPHILIZED, FOR SOLUTION INTRAMUSCULAR; INTRAVENOUS
Refills: 0 | Status: DISCONTINUED | OUTPATIENT
Start: 2022-05-08 | End: 2022-05-11

## 2022-05-08 RX ORDER — POLYETHYLENE GLYCOL 3350 17 G/17G
17 POWDER, FOR SOLUTION ORAL DAILY
Refills: 0 | Status: DISCONTINUED | OUTPATIENT
Start: 2022-05-08 | End: 2022-05-11

## 2022-05-08 RX ORDER — ERTAPENEM SODIUM 1 G/1
1000 INJECTION, POWDER, LYOPHILIZED, FOR SOLUTION INTRAMUSCULAR; INTRAVENOUS EVERY 24 HOURS
Refills: 0 | Status: DISCONTINUED | OUTPATIENT
Start: 2022-05-09 | End: 2022-05-11

## 2022-05-08 RX ORDER — ACETAMINOPHEN 500 MG
975 TABLET ORAL EVERY 6 HOURS
Refills: 0 | Status: DISCONTINUED | OUTPATIENT
Start: 2022-05-08 | End: 2022-05-11

## 2022-05-08 RX ORDER — OXYCODONE HYDROCHLORIDE 5 MG/1
5 TABLET ORAL
Refills: 0 | Status: DISCONTINUED | OUTPATIENT
Start: 2022-05-08 | End: 2022-05-11

## 2022-05-08 RX ORDER — IRON SUCROSE 20 MG/ML
200 INJECTION, SOLUTION INTRAVENOUS ONCE
Refills: 0 | Status: COMPLETED | OUTPATIENT
Start: 2022-05-08 | End: 2022-05-08

## 2022-05-08 RX ORDER — SODIUM CHLORIDE 9 MG/ML
3200 INJECTION, SOLUTION INTRAVENOUS ONCE
Refills: 0 | Status: COMPLETED | OUTPATIENT
Start: 2022-05-08 | End: 2022-05-08

## 2022-05-08 RX ORDER — OXYCODONE HYDROCHLORIDE 5 MG/1
5 TABLET ORAL ONCE
Refills: 0 | Status: DISCONTINUED | OUTPATIENT
Start: 2022-05-08 | End: 2022-05-08

## 2022-05-08 RX ORDER — ERTAPENEM SODIUM 1 G/1
1000 INJECTION, POWDER, LYOPHILIZED, FOR SOLUTION INTRAMUSCULAR; INTRAVENOUS ONCE
Refills: 0 | Status: COMPLETED | OUTPATIENT
Start: 2022-05-08 | End: 2022-05-08

## 2022-05-08 RX ADMIN — HEPARIN SODIUM 5000 UNIT(S): 5000 INJECTION INTRAVENOUS; SUBCUTANEOUS at 12:33

## 2022-05-08 RX ADMIN — Medication 1000 MILLIGRAM(S): at 07:00

## 2022-05-08 RX ADMIN — OXYCODONE HYDROCHLORIDE 5 MILLIGRAM(S): 5 TABLET ORAL at 04:30

## 2022-05-08 RX ADMIN — Medication 400 MILLIGRAM(S): at 12:48

## 2022-05-08 RX ADMIN — OXYCODONE HYDROCHLORIDE 5 MILLIGRAM(S): 5 TABLET ORAL at 20:58

## 2022-05-08 RX ADMIN — OXYCODONE HYDROCHLORIDE 5 MILLIGRAM(S): 5 TABLET ORAL at 21:30

## 2022-05-08 RX ADMIN — Medication 975 MILLIGRAM(S): at 21:30

## 2022-05-08 RX ADMIN — OXYCODONE HYDROCHLORIDE 5 MILLIGRAM(S): 5 TABLET ORAL at 12:34

## 2022-05-08 RX ADMIN — SIMETHICONE 80 MILLIGRAM(S): 80 TABLET, CHEWABLE ORAL at 12:34

## 2022-05-08 RX ADMIN — ERTAPENEM SODIUM 120 MILLIGRAM(S): 1 INJECTION, POWDER, LYOPHILIZED, FOR SOLUTION INTRAMUSCULAR; INTRAVENOUS at 23:09

## 2022-05-08 RX ADMIN — Medication 400 MILLIGRAM(S): at 00:07

## 2022-05-08 RX ADMIN — HEPARIN SODIUM 5000 UNIT(S): 5000 INJECTION INTRAVENOUS; SUBCUTANEOUS at 23:03

## 2022-05-08 RX ADMIN — IRON SUCROSE 110 MILLIGRAM(S): 20 INJECTION, SOLUTION INTRAVENOUS at 17:00

## 2022-05-08 RX ADMIN — Medication 1000 MILLIGRAM(S): at 01:37

## 2022-05-08 RX ADMIN — Medication 40 MILLIEQUIVALENT(S): at 23:56

## 2022-05-08 RX ADMIN — POLYETHYLENE GLYCOL 3350 17 GRAM(S): 17 POWDER, FOR SOLUTION ORAL at 12:34

## 2022-05-08 RX ADMIN — OXYCODONE HYDROCHLORIDE 5 MILLIGRAM(S): 5 TABLET ORAL at 23:56

## 2022-05-08 RX ADMIN — Medication 500 MILLIGRAM(S): at 12:34

## 2022-05-08 RX ADMIN — HEPARIN SODIUM 5000 UNIT(S): 5000 INJECTION INTRAVENOUS; SUBCUTANEOUS at 00:06

## 2022-05-08 RX ADMIN — SODIUM CHLORIDE 3200 MILLILITER(S): 9 INJECTION, SOLUTION INTRAVENOUS at 21:03

## 2022-05-08 RX ADMIN — Medication 975 MILLIGRAM(S): at 20:39

## 2022-05-08 RX ADMIN — Medication 400 MILLIGRAM(S): at 06:16

## 2022-05-08 RX ADMIN — Medication 325 MILLIGRAM(S): at 12:34

## 2022-05-08 RX ADMIN — Medication 1000 MILLIGRAM(S): at 13:20

## 2022-05-08 RX ADMIN — OXYCODONE HYDROCHLORIDE 5 MILLIGRAM(S): 5 TABLET ORAL at 18:15

## 2022-05-08 RX ADMIN — OXYCODONE HYDROCHLORIDE 5 MILLIGRAM(S): 5 TABLET ORAL at 13:05

## 2022-05-08 RX ADMIN — OXYCODONE HYDROCHLORIDE 5 MILLIGRAM(S): 5 TABLET ORAL at 17:42

## 2022-05-08 RX ADMIN — OXYCODONE HYDROCHLORIDE 5 MILLIGRAM(S): 5 TABLET ORAL at 03:37

## 2022-05-08 RX ADMIN — OXYCODONE HYDROCHLORIDE 5 MILLIGRAM(S): 5 TABLET ORAL at 23:03

## 2022-05-08 NOTE — CHART NOTE - NSCHARTNOTEFT_GEN_A_CORE
R4 Chart Note    Called by RN as temperature is 101.5 F and patient complains of chills and feeling cold.    Assessed patient at bedside:    Patient complains of feeling chills. Denies headache, lightheadedness, n/v, dysuria, CP, SOB. Has abdominal pain and is finding it difficult to move due to it. She is still declining a blood transfusion. She is breastfeeding. Denies breast pain.    ICU Vital Signs Last 24 Hrs  T(C): 37.6 (08 May 2022 21:30), Max: 38.6 (08 May 2022 20:30)  T(F): 99.6 (08 May 2022 21:30), Max: 101.5 (08 May 2022 20:30)  HR: 100 (08 May 2022 21:30) (83 - 104)  BP: 127/74 (08 May 2022 21:30) (112/72 - 134/80)  RR: 18 (08 May 2022 21:30) (18 - 18)  SpO2: 96% (08 May 2022 21:30) (96% - 100%)    Physical Exam:   General: appears clammy, is covered in blankets  CV: low tachycardia to low 100s  Lungs: CTA b/l, good air flow b/l   Back: No CVA tenderness  Abd: significant tenderness on fundus and upper abdomen. Distended with gas.  Incisions c/d/i with steri-strips place. no erthythema or edema around incision  Vaginal: normal lochia on pad  Ext: NT b/l, no edema, neg Don's sign    A/P: 38 yo  POD#1 from rLTCS+BS with acute on chronic anemia 2/2 EBL 1500 and new onset chills and tachycardia, now with fever likely 2/2 endometritis. Low suspicion for PE, mastitis, SSI.    - patient counseled extensively on level of anemia and its impact on her recovery especially in the setting of an active infection. She is still declining transfusion  - c/w IV iron, po iron, and Vit C  - stat CBC, coags, CMP, lactate  - stat blood, urine cx  - LR bolus  - Invanz once cultures collected    D/w Dr. Jennifer Sullivan PGY-4

## 2022-05-08 NOTE — CHART NOTE - NSCHARTNOTEFT_GEN_A_CORE
Patient's repeat noon H/H 6.2/21.0->5.9/19.8. Patient continues to be asymptomatic. She says that she has gotten up and walked around her room multiple times without feeling dizzy, lightheaded, SOB. Recommended blood transfusion to patient but she is currently refusing. Patient counseled that we expect her H/H to continue to downtrend, that anemia can make it difficult to breastfeed and can result in end organ ischemia, and that we are concerned she may become symptomatic and pass out at home. She was also counseled on the risks and benefits of blood transfusions including the extremely low risk that she would contract any blood transmitted infections and the benefit of increasing her Hgb and preventing the symptoms mentioned beforehand. Patient aware that she can change her mind and we will transfuse.    T(C): 37.1 (22 @ 12:23), Max: 37.2 (22 @ 21:38)  HR: 88 (22 @ 12:23) (68 - 88)  BP: 125/80 (22 @ 12:23) (104/56 - 147/63)  RR: 18 (22 @ 12:23) (18 - 20)  SpO2: 99% (22 @ 12:23) (98% - 100%)    Gen: NAD  CV: RRR, HR auscultated to 88  Pulm: CTAB  Abd: Fundus firm, Incision c/d/i  Vagina: Patient just changed pad, scant blood seen. No active vaginal bleeding with fundal pressure    A/P: 36 yo  POD#1 from rLTCS+BS with asymptomatic acute on chronic anemia 2/2 EBL 1500    - Will order IV iron 200mg.   - f/u AM CBC  - VSS, continue to monitor    d/w Dr. Jennifer Farrell, PGY1

## 2022-05-08 NOTE — CHART NOTE - NSCHARTNOTEFT_GEN_A_CORE
Patient reporting chills that started at 1pm. Her HR is also now 104, previously 88. Patient continues to deny other symptoms including lightheaded, dizziness, CP, SOB. Strongly advised patient to consent to a blood transfusion as she is now starting to show signs and symptoms of acute blood loss anemia. However patient is still refusing. She says that her baseline HR is in the 110-120s so she is not concerned although she was informed that we are concerned because of the acute increase in HR baseline. Also advised patient that she may develop more signs and symptoms overnight and may not be aware because she will be asleep. Patient is still refusing transfusion. She does not have specific questions or concerns about blood transfusion right now and says that she would like "more time to think about it". IV iron currently running.    Gen: NAD, currently breastfeeding  CV: S1, S2  Pulm: CTAB  Abd: fundus firm    T(C): 37.5 (22 @ 17:00), Max: 37.5 (22 @ 17:00)  HR: 104 (22 @ 17:00) (80 - 104)  BP: 134/80 (--22 @ 17:00) (110/62 - 134/80)  RR: 18 (22 @ 17:00) (18 - 18)  SpO2: 100% (22 @ 17:00) (98% - 100%)    A/P: 36 yo  POD#1 from rLTCS+BS with acute on chronic anemia 2/2 EBL 1500 and new onset chills and tachycardia    - c/w IV iron, po iron, and Vit C  - AM CBC  - Will consider signing a blood refusal form.    d/w Dr. Jennifer Farrell, PGY1

## 2022-05-08 NOTE — PROGRESS NOTE ADULT - SUBJECTIVE AND OBJECTIVE BOX
OB Progress Note:  Delivery, POD#1    S: 38yo POD#1 s/p rLTCS+BS . Her pain is well controlled. She is tolerating a regular diet but is not yet passing flatus. Endorses two episodes of emesis overnight; however, denies being nauseas on examination this AM. Denies CP/SOB/lightheadedness/dizziness.   She is ambulating without difficulty.   She is voiding spontanesouly.    O:   Vital Signs Last 24 Hrs  T(C): 36.9 (08 May 2022 00:30), Max: 37.2 (07 May 2022 04:59)  T(F): 98.4 (08 May 2022 00:30), Max: 99 (07 May 2022 04:59)  HR: 85 (07 May 2022 21:38) (58 - 101)  BP: 121/77 (07 May 2022 21:38) (104/56 - 154/90)  BP(mean): 80 (07 May 2022 20:00) (78 - 95)  RR: 18 (08 May 2022 00:30) (18 - 20)  SpO2: 98% (08 May 2022 00:30) (87% - 100%)    Labs:  Blood type: O Positive  Rubella IgG: RPR: Negative                          7.2<L>   18.08<H> >-----------< 136<L>    (  @ 18:22 )             24.7<L>                        8.0<L>   7.68 >-----------< 142<L>    (  @ 05:22 )             26.3<L>        PE:  General: NAD  Abdomen: Mildly distended, appropriately tender, incision c/d/i.  Extremities: No erythema, no pitting edema

## 2022-05-08 NOTE — PROGRESS NOTE ADULT - SUBJECTIVE AND OBJECTIVE BOX
Day _1__ of Anesthesia Pain Management Service    SUBJECTIVE:  Pain Scale Score	At rest: ___ 	With Activity: ___ 	[ x] Refer to charted pain scores    THERAPY:    s/p _____2___ mg PF morphine on _5/7/22_____      MEDICATIONS  (STANDING):  acetaminophen     Tablet .. 975 milliGRAM(s) Oral every 6 hours  acetaminophen   IVPB .. 1000 milliGRAM(s) IV Intermittent every 6 hours  ascorbic acid 500 milliGRAM(s) Oral daily  diphtheria/tetanus/pertussis (acellular) Vaccine (ADAcel) 0.5 milliLiter(s) IntraMuscular once  ferrous    sulfate 325 milliGRAM(s) Oral daily  heparin   Injectable 5000 Unit(s) SubCutaneous every 12 hours  lactated ringers. 1000 milliLiter(s) (75 mL/Hr) IV Continuous <Continuous>  morphine PF Epidural 2 milliGRAM(s) Epidural once  oxytocin Infusion 333.333 milliUNIT(s)/Min (1000 mL/Hr) IV Continuous <Continuous>  oxytocin Infusion 333.333 milliUNIT(s)/Min (1000 mL/Hr) IV Continuous <Continuous>  polyethylene glycol 3350 17 Gram(s) Oral daily  sodium chloride 0.9% Bolus 500 milliLiter(s) IV Bolus once  sodium chloride 0.9%. 1000 milliLiter(s) (150 mL/Hr) IV Continuous <Continuous>    MEDICATIONS  (PRN):  dexAMETHasone  Injectable 4 milliGRAM(s) IV Push every 6 hours PRN Nausea  diphenhydrAMINE 25 milliGRAM(s) Oral every 6 hours PRN Pruritus  lanolin Ointment 1 Application(s) Topical every 6 hours PRN Sore Nipples  magnesium hydroxide Suspension 30 milliLiter(s) Oral two times a day PRN Constipation  naloxone Injectable 0.1 milliGRAM(s) IV Push every 3 minutes PRN For ANY of the following changes in patient status:  A. Breaths Per Minute LESS THAN 10, B. Oxygen saturation LESS THAN 90%, C. Sedation score of 6 for Stop After: 4 Times  ondansetron Injectable 4 milliGRAM(s) IV Push every 6 hours PRN Nausea  oxyCODONE    IR 5 milliGRAM(s) Oral every 3 hours PRN Moderate to Severe Pain (4-10)  oxyCODONE    IR 5 milliGRAM(s) Oral once PRN Moderate to Severe Pain (4-10)  oxyCODONE    IR 5 milliGRAM(s) Oral every 3 hours PRN Mild Pain (1 - 3)  simethicone 80 milliGRAM(s) Chew every 4 hours PRN Gas      OBJECTIVE:    Sedation Score:	[x ] Alert	[ ] Drowsy	[ ] Arousable	[ ] Asleep	[ ] Unresponsive    Side Effects:	[x ] None	[ ] Nausea	[ ] Vomiting	[ ] Pruritus  		  [ ] Weakness		[ ] Numbness	[ ] Other:    Vital Signs Last 24 Hrs  T(C): 36.8 (08 May 2022 08:45), Max: 37.2 (07 May 2022 21:38)  T(F): 98.3 (08 May 2022 08:45), Max: 99 (07 May 2022 21:38)  HR: 83 (08 May 2022 08:45) (62 - 86)  BP: 114/74 (08 May 2022 08:45) (104/56 - 154/90)  BP(mean): 80 (07 May 2022 20:00) (78 - 95)  RR: 18 (08 May 2022 08:45) (18 - 20)  SpO2: 99% (08 May 2022 08:45) (87% - 100%)    ASSESSMENT/ PLAN  [ x] Patient transitioned to prn analgesics  [x ] Pain management per primary service, pain service to sign off   [ ]Documentation and Verification of current medications     Comments:

## 2022-05-08 NOTE — CHART NOTE - NSCHARTNOTEFT_GEN_A_CORE
Evaluated patient for H/H 6.2/21.0. Patient is asymptomatic and is anemic at baseline. Starting H/H 8/26.3 She denies feeling lightheaded, dizziness, CP, SOB. She has been ambulating and voiding spontaneously.     CV: RRR  Pulm: CTAB  Abd: fundus firm    A/P: 38 yo POD#1 from rLTCS+BS for Cat 2 tracing with asymptomatic acute on chronic anemia    - Repeat CBC at 12p  - Iron, Vit C, Miralax  - VSS    d/w Dr. Jennifer Farrell, PGY1

## 2022-05-09 LAB
ANION GAP SERPL CALC-SCNC: 15 MMOL/L — SIGNIFICANT CHANGE UP (ref 5–17)
BASOPHILS # BLD AUTO: 0.02 K/UL — SIGNIFICANT CHANGE UP (ref 0–0.2)
BASOPHILS NFR BLD AUTO: 0.2 % — SIGNIFICANT CHANGE UP (ref 0–2)
BUN SERPL-MCNC: 4 MG/DL — LOW (ref 7–23)
CALCIUM SERPL-MCNC: 8.8 MG/DL — SIGNIFICANT CHANGE UP (ref 8.4–10.5)
CHLORIDE SERPL-SCNC: 102 MMOL/L — SIGNIFICANT CHANGE UP (ref 96–108)
CO2 SERPL-SCNC: 22 MMOL/L — SIGNIFICANT CHANGE UP (ref 22–31)
CREAT SERPL-MCNC: 0.71 MG/DL — SIGNIFICANT CHANGE UP (ref 0.5–1.3)
EGFR: 113 ML/MIN/1.73M2 — SIGNIFICANT CHANGE UP
EOSINOPHIL # BLD AUTO: 0.05 K/UL — SIGNIFICANT CHANGE UP (ref 0–0.5)
EOSINOPHIL NFR BLD AUTO: 0.4 % — SIGNIFICANT CHANGE UP (ref 0–6)
GLUCOSE SERPL-MCNC: 78 MG/DL — SIGNIFICANT CHANGE UP (ref 70–99)
HCT VFR BLD CALC: 18 % — CRITICAL LOW (ref 34.5–45)
HGB BLD-MCNC: 5.3 G/DL — CRITICAL LOW (ref 11.5–15.5)
IMM GRANULOCYTES NFR BLD AUTO: 0.5 % — SIGNIFICANT CHANGE UP (ref 0–1.5)
LYMPHOCYTES # BLD AUTO: 1.75 K/UL — SIGNIFICANT CHANGE UP (ref 1–3.3)
LYMPHOCYTES # BLD AUTO: 13.3 % — SIGNIFICANT CHANGE UP (ref 13–44)
MCHC RBC-ENTMCNC: 20.7 PG — LOW (ref 27–34)
MCHC RBC-ENTMCNC: 29.4 GM/DL — LOW (ref 32–36)
MCV RBC AUTO: 70.3 FL — LOW (ref 80–100)
MONOCYTES # BLD AUTO: 1.1 K/UL — HIGH (ref 0–0.9)
MONOCYTES NFR BLD AUTO: 8.4 % — SIGNIFICANT CHANGE UP (ref 2–14)
NEUTROPHILS # BLD AUTO: 10.14 K/UL — HIGH (ref 1.8–7.4)
NEUTROPHILS NFR BLD AUTO: 77.2 % — HIGH (ref 43–77)
NRBC # BLD: 0 /100 WBCS — SIGNIFICANT CHANGE UP (ref 0–0)
PLATELET # BLD AUTO: 132 K/UL — LOW (ref 150–400)
POTASSIUM SERPL-MCNC: 3.9 MMOL/L — SIGNIFICANT CHANGE UP (ref 3.5–5.3)
POTASSIUM SERPL-SCNC: 3.9 MMOL/L — SIGNIFICANT CHANGE UP (ref 3.5–5.3)
RBC # BLD: 2.56 M/UL — LOW (ref 3.8–5.2)
RBC # FLD: 17.9 % — HIGH (ref 10.3–14.5)
SODIUM SERPL-SCNC: 139 MMOL/L — SIGNIFICANT CHANGE UP (ref 135–145)
WBC # BLD: 13.13 K/UL — HIGH (ref 3.8–10.5)
WBC # FLD AUTO: 13.13 K/UL — HIGH (ref 3.8–10.5)

## 2022-05-09 RX ORDER — SENNA PLUS 8.6 MG/1
1 TABLET ORAL ONCE
Refills: 0 | Status: COMPLETED | OUTPATIENT
Start: 2022-05-09 | End: 2022-05-09

## 2022-05-09 RX ORDER — ONDANSETRON 8 MG/1
4 TABLET, FILM COATED ORAL ONCE
Refills: 0 | Status: DISCONTINUED | OUTPATIENT
Start: 2022-05-09 | End: 2022-05-11

## 2022-05-09 RX ORDER — SENNA PLUS 8.6 MG/1
2 TABLET ORAL AT BEDTIME
Refills: 0 | Status: DISCONTINUED | OUTPATIENT
Start: 2022-05-09 | End: 2022-05-11

## 2022-05-09 RX ADMIN — Medication 975 MILLIGRAM(S): at 02:47

## 2022-05-09 RX ADMIN — Medication 975 MILLIGRAM(S): at 21:00

## 2022-05-09 RX ADMIN — OXYCODONE HYDROCHLORIDE 5 MILLIGRAM(S): 5 TABLET ORAL at 02:49

## 2022-05-09 RX ADMIN — Medication 325 MILLIGRAM(S): at 11:18

## 2022-05-09 RX ADMIN — Medication 975 MILLIGRAM(S): at 07:47

## 2022-05-09 RX ADMIN — HEPARIN SODIUM 5000 UNIT(S): 5000 INJECTION INTRAVENOUS; SUBCUTANEOUS at 12:26

## 2022-05-09 RX ADMIN — OXYCODONE HYDROCHLORIDE 5 MILLIGRAM(S): 5 TABLET ORAL at 13:45

## 2022-05-09 RX ADMIN — Medication 975 MILLIGRAM(S): at 20:04

## 2022-05-09 RX ADMIN — HEPARIN SODIUM 5000 UNIT(S): 5000 INJECTION INTRAVENOUS; SUBCUTANEOUS at 23:04

## 2022-05-09 RX ADMIN — OXYCODONE HYDROCHLORIDE 5 MILLIGRAM(S): 5 TABLET ORAL at 01:00

## 2022-05-09 RX ADMIN — Medication 975 MILLIGRAM(S): at 13:45

## 2022-05-09 RX ADMIN — SENNA PLUS 2 TABLET(S): 8.6 TABLET ORAL at 23:04

## 2022-05-09 RX ADMIN — Medication 975 MILLIGRAM(S): at 14:40

## 2022-05-09 RX ADMIN — Medication 500 MILLIGRAM(S): at 11:17

## 2022-05-09 RX ADMIN — OXYCODONE HYDROCHLORIDE 5 MILLIGRAM(S): 5 TABLET ORAL at 07:47

## 2022-05-09 RX ADMIN — OXYCODONE HYDROCHLORIDE 5 MILLIGRAM(S): 5 TABLET ORAL at 23:03

## 2022-05-09 RX ADMIN — SIMETHICONE 80 MILLIGRAM(S): 80 TABLET, CHEWABLE ORAL at 07:47

## 2022-05-09 RX ADMIN — OXYCODONE HYDROCHLORIDE 5 MILLIGRAM(S): 5 TABLET ORAL at 17:10

## 2022-05-09 RX ADMIN — MAGNESIUM HYDROXIDE 30 MILLILITER(S): 400 TABLET, CHEWABLE ORAL at 10:20

## 2022-05-09 RX ADMIN — Medication 40 MILLIEQUIVALENT(S): at 09:17

## 2022-05-09 RX ADMIN — OXYCODONE HYDROCHLORIDE 5 MILLIGRAM(S): 5 TABLET ORAL at 14:40

## 2022-05-09 RX ADMIN — SIMETHICONE 80 MILLIGRAM(S): 80 TABLET, CHEWABLE ORAL at 20:04

## 2022-05-09 RX ADMIN — OXYCODONE HYDROCHLORIDE 5 MILLIGRAM(S): 5 TABLET ORAL at 02:10

## 2022-05-09 RX ADMIN — OXYCODONE HYDROCHLORIDE 5 MILLIGRAM(S): 5 TABLET ORAL at 11:17

## 2022-05-09 RX ADMIN — Medication 975 MILLIGRAM(S): at 08:30

## 2022-05-09 RX ADMIN — OXYCODONE HYDROCHLORIDE 5 MILLIGRAM(S): 5 TABLET ORAL at 08:30

## 2022-05-09 RX ADMIN — OXYCODONE HYDROCHLORIDE 5 MILLIGRAM(S): 5 TABLET ORAL at 20:04

## 2022-05-09 RX ADMIN — Medication 40 MILLIEQUIVALENT(S): at 04:33

## 2022-05-09 RX ADMIN — SIMETHICONE 80 MILLIGRAM(S): 80 TABLET, CHEWABLE ORAL at 12:22

## 2022-05-09 RX ADMIN — OXYCODONE HYDROCHLORIDE 5 MILLIGRAM(S): 5 TABLET ORAL at 11:50

## 2022-05-09 RX ADMIN — ERTAPENEM SODIUM 120 MILLIGRAM(S): 1 INJECTION, POWDER, LYOPHILIZED, FOR SOLUTION INTRAMUSCULAR; INTRAVENOUS at 23:51

## 2022-05-09 NOTE — PROGRESS NOTE ADULT - SUBJECTIVE AND OBJECTIVE BOX
OB Postpartum Note: Repeat  Delivery, POD#2    S: 36yo POD#2 s/p rLTCS+BS. Her pain is well controlled. She is tolerating a regular diet. Has not yet passed flatus. Voiding spontaneously and ambulating without difficulty. Denies N/V. Denies CP/SOB/lightheadedness/dizziness. Her symptoms of chills and sweats have improved. She is nor longer febrile and tolerating abx well.    O:   Vitals:  Vital Signs Last 24 Hrs  T(C): 37.6 (09 May 2022 03:00), Max: 38.6 (08 May 2022 20:30)  T(F): 99.7 (09 May 2022 03:00), Max: 101.5 (08 May 2022 20:30)  HR: 100 (09 May 2022 03:00) (83 - 104)  BP: 112/70 (09 May 2022 03:00) (112/70 - 134/80)  RR: 18 (09 May 2022 03:00) (18 - 18)  SpO2: 97% (09 May 2022 03:00) (96% - 100%)    Labs:  Blood type: O Positive  Rubella IgG: RPR: Negative                          5.8<LL>   13.78<H> >-----------< 129<L>    (  @ 21:18 )             19.2<LL>                        5.9<LL>   15.18<H> >-----------< 131<L>    (  @ 12:37 )             19.8<LL>                        6.2<LL>   17.01<H> >-----------< 129<L>    (  @ 07:56 )             21.0<LL>                        7.2<L>   18.08<H> >-----------< 136<L>    (  @ 18:22 )             24.7<L>                        8.0<L>   7.68 >-----------< 142<L>    (  @ 05:22 )             26.3<L>    22 @ 21:18      138  |  103  |  4<L>  ----------------------------<  95  3.3<L>   |  23  |  0.71        Ca    8.9      08 May 2022 21:18    TPro  5.3<L>  /  Alb  2.9<L>  /  TBili  0.3  /  DBili  x   /  AST  54<H>  /  ALT  16  /  AlkPhos  103  22 @ 21:18          PE:  General: NAD  Abdomen: mildly distended,appropriately tender, incision c/d/i.  Extremities: SCDs in place, no erythema    A/P: 36yo GP POD#3 s/p rLTCS.  Patient is stable and doing well post-operatively.   - Continue regular diet.  - Increase ambulation.  - Continue motrin, tylenol, oxycodone PRN for pain control.  - Discharge planning.    Claire Gaspar PGY-2           OB Postpartum Note: Repeat  Delivery, POD#2    S: 36yo POD#2 s/p rLTCS+BS. Her pain is well controlled. She is tolerating a regular diet. Has not yet passed flatus. Voiding spontaneously and ambulating without difficulty. Denies N/V. Denies CP/SOB/lightheadedness/dizziness. Her symptoms of chills and sweats have improved. She is nor longer febrile and tolerating abx well.    O:   Vitals:  Vital Signs Last 24 Hrs  T(C): 37.6 (09 May 2022 03:00), Max: 38.6 (08 May 2022 20:30)  T(F): 99.7 (09 May 2022 03:00), Max: 101.5 (08 May 2022 20:30)  HR: 100 (09 May 2022 03:00) (83 - 104)  BP: 112/70 (09 May 2022 03:00) (112/70 - 134/80)  RR: 18 (09 May 2022 03:00) (18 - 18)  SpO2: 97% (09 May 2022 03:00) (96% - 100%)    Labs:  Blood type: O Positive  Rubella IgG: RPR: Negative                          5.8<LL>   13.78<H> >-----------< 129<L>    (  @ 21:18 )             19.2<LL>                        5.9<LL>   15.18<H> >-----------< 131<L>    (  @ 12:37 )             19.8<LL>                        6.2<LL>   17.01<H> >-----------< 129<L>    (  @ 07:56 )             21.0<LL>                        7.2<L>   18.08<H> >-----------< 136<L>    (  @ 18:22 )             24.7<L>                        8.0<L>   7.68 >-----------< 142<L>    (  @ 05:22 )             26.3<L>    22 @ 21:18      138  |  103  |  4<L>  ----------------------------<  95  3.3<L>   |  23  |  0.71        Ca    8.9      08 May 2022 21:18    TPro  5.3<L>  /  Alb  2.9<L>  /  TBili  0.3  /  DBili  x   /  AST  54<H>  /  ALT  16  /  AlkPhos  103  22 @ 21:18

## 2022-05-09 NOTE — PROVIDER CONTACT NOTE (CHANGE IN STATUS NOTIFICATION) - RECOMMENDATIONS
pt stated that with her previous deliveries she developed an infection and is concerned that it may occur again

## 2022-05-09 NOTE — PROVIDER CONTACT NOTE (CRITICAL VALUE NOTIFICATION) - ACTION/TREATMENT ORDERED:
No further orders.
pt refuse transfusion, continue to monitor
no new orders at present time
will discuss with attending

## 2022-05-09 NOTE — CHART NOTE - NSCHARTNOTESELECT_GEN_ALL_CORE
Eval Note
Event Note
Event Note
 Note
Event Note
fever/Event Note
3 - Moderate disability. Requires some help, but able to walk unassisted.

## 2022-05-09 NOTE — PROGRESS NOTE ADULT - NSPROGADDITIONALINFOA_GEN_ALL_CORE
anemia asymptomatic,  continue vitamins and iron
post op day two, had fevers, currently on antibiotics  anemia, feels less symptomatic declines blood continue iron, received iv iron

## 2022-05-09 NOTE — PROVIDER CONTACT NOTE (CRITICAL VALUE NOTIFICATION) - RECOMMENDATIONS
Spoke with Resident.  Asked patient if she would consent for a blood transfusion.  Patient refused transfusion.  Resident Ashanti aware.  Monitor for symptoms.
notify provider

## 2022-05-09 NOTE — CHART NOTE - NSCHARTNOTEFT_GEN_A_CORE
R1 Eval Note    Evaluated pt at bedside following report from RN of abdominal distension in the setting of lack of flatus and abdominal pain from lack of flatus.    Patient states that she has not passed flatus since her  section. Has been able to tolerate small amount of food without nausea/vomiting. Now noting diffuse abdominal pain upon palpation that improves with massage. On physical exam noted to be distended with tympanic but soft abdomen. Previously received milk of magnesia four hours prior, no bowel movements. Vital signs stable.    Additionally re-confirmed with patient declining blood transfusion.    - Distension/diffuse abdominal pain likely due to gas  - additional senna  - Encouraged tea, ambulation    [facility]  PGY-1 R1 Eval Note    Evaluated pt at bedside following report from RN of abdominal distension in the setting of lack of flatus and abdominal pain from lack of flatus.    Patient states that she has not passed flatus since her  section. Has been able to tolerate small amount of food without nausea/vomiting. Now noting diffuse abdominal pain upon palpation that improves with massage. On physical exam noted to be distended with tympanic but soft abdomen. Previously received milk of magnesia four hours prior, no bowel movements. Vital signs stable.    Additionally re-confirmed with patient declining blood transfusion.    - Distension/diffuse abdominal pain likely due to gas  - additional senna  - Encouraged tea, ambulation    Addendum:  - Contacted RN at 642p concerning status, stated that pt pain had improved to 4/10 and had passed gas    Ashanti Sheu  PGY-1

## 2022-05-09 NOTE — PROVIDER CONTACT NOTE (CHANGE IN STATUS NOTIFICATION) - ASSESSMENT
pt is denying dizziness or lightheadedness- pt is refusing a blood transfusion- she was explained the importance of getting transfused but is refusing

## 2022-05-09 NOTE — PROGRESS NOTE ADULT - ATTENDING COMMENTS
post op day one anemia, was admitted with hemoglobin of 8 now 7 for continued iron and vitamins, patient is asymptomatic
post op day two anemia, now feels ok no dizziness, continue iron supplementation  fevers, continue antibiotics  continue post op care

## 2022-05-10 RX ADMIN — Medication 975 MILLIGRAM(S): at 09:00

## 2022-05-10 RX ADMIN — OXYCODONE HYDROCHLORIDE 5 MILLIGRAM(S): 5 TABLET ORAL at 20:45

## 2022-05-10 RX ADMIN — OXYCODONE HYDROCHLORIDE 5 MILLIGRAM(S): 5 TABLET ORAL at 05:15

## 2022-05-10 RX ADMIN — OXYCODONE HYDROCHLORIDE 5 MILLIGRAM(S): 5 TABLET ORAL at 17:38

## 2022-05-10 RX ADMIN — OXYCODONE HYDROCHLORIDE 5 MILLIGRAM(S): 5 TABLET ORAL at 06:00

## 2022-05-10 RX ADMIN — POLYETHYLENE GLYCOL 3350 17 GRAM(S): 17 POWDER, FOR SOLUTION ORAL at 11:50

## 2022-05-10 RX ADMIN — Medication 500 MILLIGRAM(S): at 11:50

## 2022-05-10 RX ADMIN — OXYCODONE HYDROCHLORIDE 5 MILLIGRAM(S): 5 TABLET ORAL at 20:05

## 2022-05-10 RX ADMIN — Medication 325 MILLIGRAM(S): at 11:50

## 2022-05-10 RX ADMIN — SIMETHICONE 80 MILLIGRAM(S): 80 TABLET, CHEWABLE ORAL at 02:07

## 2022-05-10 RX ADMIN — OXYCODONE HYDROCHLORIDE 5 MILLIGRAM(S): 5 TABLET ORAL at 12:30

## 2022-05-10 RX ADMIN — Medication 975 MILLIGRAM(S): at 02:04

## 2022-05-10 RX ADMIN — OXYCODONE HYDROCHLORIDE 5 MILLIGRAM(S): 5 TABLET ORAL at 02:57

## 2022-05-10 RX ADMIN — OXYCODONE HYDROCHLORIDE 5 MILLIGRAM(S): 5 TABLET ORAL at 11:49

## 2022-05-10 RX ADMIN — OXYCODONE HYDROCHLORIDE 5 MILLIGRAM(S): 5 TABLET ORAL at 00:00

## 2022-05-10 RX ADMIN — HEPARIN SODIUM 5000 UNIT(S): 5000 INJECTION INTRAVENOUS; SUBCUTANEOUS at 11:49

## 2022-05-10 RX ADMIN — OXYCODONE HYDROCHLORIDE 5 MILLIGRAM(S): 5 TABLET ORAL at 16:59

## 2022-05-10 RX ADMIN — SIMETHICONE 80 MILLIGRAM(S): 80 TABLET, CHEWABLE ORAL at 06:13

## 2022-05-10 RX ADMIN — OXYCODONE HYDROCHLORIDE 5 MILLIGRAM(S): 5 TABLET ORAL at 02:04

## 2022-05-10 RX ADMIN — Medication 975 MILLIGRAM(S): at 08:22

## 2022-05-10 RX ADMIN — Medication 975 MILLIGRAM(S): at 02:57

## 2022-05-10 NOTE — PROGRESS NOTE ADULT - SUBJECTIVE AND OBJECTIVE BOX
Patient seen and examined at bedside, no acute overnight events. No acute complaints, pain well controlled. Patient is ambulating and tolerating regular diet. Passing flatus, voiding spontaneously. Denies CP, SOB, N/V, HA, blurred vision, epigastric pain. Patient states that her pain much improved after passing gas yesterday, feeling much better.    Vital Signs Last 24 Hours  T(C): 37 (05-10-22 @ 05:28), Max: 37.2 (05-09-22 @ 17:28)  HR: 80 (05-10-22 @ 05:28) (80 - 100)  BP: 121/77 (05-10-22 @ 05:28) (110/68 - 135/81)  RR: 18 (05-10-22 @ 05:28) (16 - 18)  SpO2: 99% (05-10-22 @ 05:28) (97% - 100%)    I&O's Summary      Physical Exam:  General: NAD  Abdomen: Soft, non-tender, non-distended, fundus firm  Incision: Pfannenstiel incision CDI, subcuticular suture closure  Pelvic: Lochia wnl, external exam of perineum clean and dry without swelling    Labs:    Blood Type: O Positive  Antibody Screen: Negative  RPR: Negative               5.3    13.13 )-----------( 132      ( 05-09 @ 08:03 )             18.0                5.8    13.78 )-----------( 129      ( 05-08 @ 21:18 )             19.2                5.9    15.18 )-----------( 131      ( 05-08 @ 12:37 )             19.8         MEDICATIONS  (STANDING):  acetaminophen     Tablet .. 975 milliGRAM(s) Oral every 6 hours  ascorbic acid 500 milliGRAM(s) Oral daily  diphtheria/tetanus/pertussis (acellular) Vaccine (ADAcel) 0.5 milliLiter(s) IntraMuscular once  ertapenem  IVPB      ertapenem  IVPB 1000 milliGRAM(s) IV Intermittent every 24 hours  ferrous    sulfate 325 milliGRAM(s) Oral daily  heparin   Injectable 5000 Unit(s) SubCutaneous every 12 hours  lactated ringers. 1000 milliLiter(s) (75 mL/Hr) IV Continuous <Continuous>  ondansetron Injectable 4 milliGRAM(s) IV Push once  oxytocin Infusion 333.333 milliUNIT(s)/Min (1000 mL/Hr) IV Continuous <Continuous>  oxytocin Infusion 333.333 milliUNIT(s)/Min (1000 mL/Hr) IV Continuous <Continuous>  polyethylene glycol 3350 17 Gram(s) Oral daily  senna 2 Tablet(s) Oral at bedtime  sodium chloride 0.9% Bolus 500 milliLiter(s) IV Bolus once  sodium chloride 0.9%. 1000 milliLiter(s) (150 mL/Hr) IV Continuous <Continuous>    MEDICATIONS  (PRN):  dexAMETHasone  Injectable 4 milliGRAM(s) IV Push every 6 hours PRN Nausea  diphenhydrAMINE 25 milliGRAM(s) Oral every 6 hours PRN Pruritus  lanolin Ointment 1 Application(s) Topical every 6 hours PRN Sore Nipples  magnesium hydroxide Suspension 30 milliLiter(s) Oral two times a day PRN Constipation  naloxone Injectable 0.1 milliGRAM(s) IV Push every 3 minutes PRN For ANY of the following changes in patient status:  A. Breaths Per Minute LESS THAN 10, B. Oxygen saturation LESS THAN 90%, C. Sedation score of 6 for Stop After: 4 Times  ondansetron Injectable 4 milliGRAM(s) IV Push every 6 hours PRN Nausea  oxyCODONE    IR 5 milliGRAM(s) Oral once PRN Moderate to Severe Pain (4-10)  oxyCODONE    IR 5 milliGRAM(s) Oral every 3 hours PRN Moderate to Severe Pain (4-10)  simethicone 80 milliGRAM(s) Chew every 4 hours PRN Gas

## 2022-05-10 NOTE — PROGRESS NOTE ADULT - ASSESSMENT
A/P: 36yo POD#2 s/p rLTCS+BS for failed TOLAC and Cat 2 tracing.  EBL 1500. Patient with fever overnight and fundal tenderness meeting criteria for endometritis. Patient also with significant acute on chronic anemia from blood loss and patient declining blood transfusion.    - Continue regular diet.  - Increase ambulation.  - awaiting return of bowel function  - Continue motrin, tylenol, oxycodone PRN for pain control.   - F/u AM CBC, BMP  -c/w Invanz  - f/u blood, urine cx from 5/8  -potassium repletion for K of 3.3    P. Uppalapati PGY-4
36yo POD#3 s/p rLTCS+BS for failed TOLAC and Cat 2 tracing.  EBL 1500. Patient with fever overnight and fundal tenderness meeting criteria for endometritis. Patient also with significant acute on chronic anemia from blood loss and patient declining blood transfusion.    - Continue regular diet.  - Increase ambulation.  - Continue motrin, tylenol, oxycodone PRN for pain control.   - c/w Invanz  - f/u urine cx from 5/8    Ashanti Olsen  PGY-1  
A/P: 36yo POD#1 s/p rLTCS+BS.  Patient is stable and doing well post-operatively.    - Continue regular diet.  - Increase ambulation.  -F/u to make sure patient passes flatus  - Continue motrin, tylenol, oxycodone PRN for pain control.   - F/u AM CBC    Laura Crowell MD PGY1

## 2022-05-11 ENCOUNTER — TRANSCRIPTION ENCOUNTER (OUTPATIENT)
Age: 37
End: 2022-05-11

## 2022-05-11 VITALS
RESPIRATION RATE: 18 BRPM | DIASTOLIC BLOOD PRESSURE: 85 MMHG | OXYGEN SATURATION: 100 % | HEART RATE: 89 BPM | TEMPERATURE: 99 F | SYSTOLIC BLOOD PRESSURE: 125 MMHG

## 2022-05-11 PROCEDURE — 88302 TISSUE EXAM BY PATHOLOGIST: CPT

## 2022-05-11 PROCEDURE — 59025 FETAL NON-STRESS TEST: CPT

## 2022-05-11 PROCEDURE — 59050 FETAL MONITOR W/REPORT: CPT

## 2022-05-11 PROCEDURE — 86780 TREPONEMA PALLIDUM: CPT

## 2022-05-11 PROCEDURE — 87040 BLOOD CULTURE FOR BACTERIA: CPT

## 2022-05-11 PROCEDURE — 81001 URINALYSIS AUTO W/SCOPE: CPT

## 2022-05-11 PROCEDURE — 85027 COMPLETE CBC AUTOMATED: CPT

## 2022-05-11 PROCEDURE — 83605 ASSAY OF LACTIC ACID: CPT

## 2022-05-11 PROCEDURE — 85610 PROTHROMBIN TIME: CPT

## 2022-05-11 PROCEDURE — 86901 BLOOD TYPING SEROLOGIC RH(D): CPT

## 2022-05-11 PROCEDURE — 86900 BLOOD TYPING SEROLOGIC ABO: CPT

## 2022-05-11 PROCEDURE — 85025 COMPLETE CBC W/AUTO DIFF WBC: CPT

## 2022-05-11 PROCEDURE — 36415 COLL VENOUS BLD VENIPUNCTURE: CPT

## 2022-05-11 PROCEDURE — C1889: CPT

## 2022-05-11 PROCEDURE — U0005: CPT

## 2022-05-11 PROCEDURE — U0003: CPT

## 2022-05-11 PROCEDURE — G0463: CPT

## 2022-05-11 PROCEDURE — 88307 TISSUE EXAM BY PATHOLOGIST: CPT

## 2022-05-11 PROCEDURE — 85730 THROMBOPLASTIN TIME PARTIAL: CPT

## 2022-05-11 PROCEDURE — 80048 BASIC METABOLIC PNL TOTAL CA: CPT

## 2022-05-11 PROCEDURE — 80053 COMPREHEN METABOLIC PANEL: CPT

## 2022-05-11 PROCEDURE — 86850 RBC ANTIBODY SCREEN: CPT

## 2022-05-11 PROCEDURE — 86769 SARS-COV-2 COVID-19 ANTIBODY: CPT

## 2022-05-11 RX ORDER — OXYCODONE HYDROCHLORIDE 5 MG/1
1 TABLET ORAL
Qty: 0 | Refills: 0 | DISCHARGE
Start: 2022-05-11

## 2022-05-11 RX ORDER — FERROUS SULFATE 325(65) MG
1 TABLET ORAL
Qty: 0 | Refills: 0 | DISCHARGE
Start: 2022-05-11

## 2022-05-11 RX ADMIN — OXYCODONE HYDROCHLORIDE 5 MILLIGRAM(S): 5 TABLET ORAL at 01:00

## 2022-05-11 RX ADMIN — OXYCODONE HYDROCHLORIDE 5 MILLIGRAM(S): 5 TABLET ORAL at 09:10

## 2022-05-11 RX ADMIN — OXYCODONE HYDROCHLORIDE 5 MILLIGRAM(S): 5 TABLET ORAL at 15:08

## 2022-05-11 RX ADMIN — Medication 500 MILLIGRAM(S): at 12:11

## 2022-05-11 RX ADMIN — SIMETHICONE 80 MILLIGRAM(S): 80 TABLET, CHEWABLE ORAL at 09:10

## 2022-05-11 RX ADMIN — OXYCODONE HYDROCHLORIDE 5 MILLIGRAM(S): 5 TABLET ORAL at 12:11

## 2022-05-11 RX ADMIN — Medication 325 MILLIGRAM(S): at 12:11

## 2022-05-11 RX ADMIN — POLYETHYLENE GLYCOL 3350 17 GRAM(S): 17 POWDER, FOR SOLUTION ORAL at 12:12

## 2022-05-11 RX ADMIN — OXYCODONE HYDROCHLORIDE 5 MILLIGRAM(S): 5 TABLET ORAL at 05:42

## 2022-05-11 RX ADMIN — Medication 975 MILLIGRAM(S): at 12:41

## 2022-05-11 RX ADMIN — OXYCODONE HYDROCHLORIDE 5 MILLIGRAM(S): 5 TABLET ORAL at 15:38

## 2022-05-11 RX ADMIN — OXYCODONE HYDROCHLORIDE 5 MILLIGRAM(S): 5 TABLET ORAL at 09:40

## 2022-05-11 RX ADMIN — OXYCODONE HYDROCHLORIDE 5 MILLIGRAM(S): 5 TABLET ORAL at 00:05

## 2022-05-11 RX ADMIN — Medication 975 MILLIGRAM(S): at 05:42

## 2022-05-11 RX ADMIN — OXYCODONE HYDROCHLORIDE 5 MILLIGRAM(S): 5 TABLET ORAL at 12:41

## 2022-05-11 RX ADMIN — Medication 975 MILLIGRAM(S): at 12:11

## 2022-05-11 RX ADMIN — HEPARIN SODIUM 5000 UNIT(S): 5000 INJECTION INTRAVENOUS; SUBCUTANEOUS at 12:11

## 2022-05-11 NOTE — DISCHARGE NOTE OB - PATIENT PORTAL LINK FT
You can access the FollowMyHealth Patient Portal offered by [facility] by registering at the following website: [email or web address]. By joining [email or web address]’s FollowMyHealth portal, you will also be able to view your health information using other applications (apps) compatible with our system.

## 2022-05-11 NOTE — DISCHARGE NOTE OB - NS MD DC FALL RISK RISK
For information on Fall & Injury Prevention, visit: [email or web address] OR  [email or web address] OR  https://www.cdc.gov/steadi/patient.html

## 2022-05-11 NOTE — DISCHARGE NOTE OB - CARE PROVIDER_API CALL
Ana Rodriguez  OBSTETRICS AND GYNECOLOGY  [address]  [place] [address]  Phone: [phone]  Fax: [phone]  Follow Up Time:

## 2022-05-11 NOTE — DISCHARGE NOTE OB - HOSPITAL COURSE
The patient was admitted for , there was non reassuring tracing and patient had a repeat low segment transverse c section, viable girl, post partum had fever which responded to antibiotics

## 2022-05-11 NOTE — DISCHARGE NOTE OB - MEDICATION SUMMARY - MEDICATIONS TO TAKE
I will START or STAY ON the medications listed below when I get home from the hospital:    oxyCODONE 5 mg oral tablet  -- 1 tab(s) by mouth every 4 to 6 hours, As Needed -10)  -- Indication: For for pain as needed    oxyCODONE 5 mg oral tablet  -- 1 tab(s) by mouth every 3 hours, As needed, Moderate to Severe Pain (4-10)  -- Indication: For for pain as needed    ferrous sulfate 325 mg (65 mg elemental iron) oral tablet  -- 1 tab(s) by mouth once a day  -- Indication: For daily

## 2022-05-11 NOTE — DISCHARGE NOTE OB - CARE PLAN
Principal Discharge DX:	Status post repeat low transverse  section  Assessment and plan of treatment:	recovery   1

## 2022-05-14 LAB
CULTURE RESULTS: SIGNIFICANT CHANGE UP
CULTURE RESULTS: SIGNIFICANT CHANGE UP
SPECIMEN SOURCE: SIGNIFICANT CHANGE UP
SPECIMEN SOURCE: SIGNIFICANT CHANGE UP

## 2022-06-10 LAB — SURGICAL PATHOLOGY STUDY: SIGNIFICANT CHANGE UP

## 2022-07-15 ENCOUNTER — APPOINTMENT (OUTPATIENT)
Dept: OTOLARYNGOLOGY | Facility: CLINIC | Age: 37
End: 2022-07-15

## 2022-07-15 VITALS
BODY MASS INDEX: 34.32 KG/M2 | DIASTOLIC BLOOD PRESSURE: 87 MMHG | HEIGHT: 65 IN | HEART RATE: 85 BPM | SYSTOLIC BLOOD PRESSURE: 122 MMHG | WEIGHT: 206 LBS

## 2022-07-15 DIAGNOSIS — H93.8X1 OTHER SPECIFIED DISORDERS OF RIGHT EAR: ICD-10-CM

## 2022-07-15 DIAGNOSIS — J34.3 HYPERTROPHY OF NASAL TURBINATES: ICD-10-CM

## 2022-07-15 DIAGNOSIS — H93.11 TINNITUS, RIGHT EAR: ICD-10-CM

## 2022-07-15 DIAGNOSIS — J34.2 DEVIATED NASAL SEPTUM: ICD-10-CM

## 2022-07-15 PROBLEM — Z00.00 ENCOUNTER FOR PREVENTIVE HEALTH EXAMINATION: Status: ACTIVE | Noted: 2022-07-15

## 2022-07-15 PROCEDURE — 99204 OFFICE O/P NEW MOD 45 MIN: CPT | Mod: 25

## 2022-07-15 PROCEDURE — 92567 TYMPANOMETRY: CPT

## 2022-07-15 PROCEDURE — 92557 COMPREHENSIVE HEARING TEST: CPT

## 2022-07-15 PROCEDURE — G0268 REMOVAL OF IMPACTED WAX MD: CPT

## 2022-07-15 RX ORDER — FLUTICASONE PROPIONATE 50 UG/1
50 SPRAY, METERED NASAL DAILY
Qty: 1 | Refills: 2 | Status: ACTIVE | COMMUNITY
Start: 2022-07-15 | End: 1900-01-01

## 2022-07-15 NOTE — PHYSICAL EXAM
[] : septum deviated to the left [Midline] : trachea located in midline position [Normal] : no rashes [de-identified] : b/l marcellan [de-identified] : turbinate hypertrophy

## 2022-07-15 NOTE — END OF VISIT
[FreeTextEntry3] : I personally saw and examined MARLY YORK in detail.  I spoke to AG Kumar regarding the assessment and plan of care. I performed the procedures and relevant physical exam.  I have reviewed the above assessment and plan of care and I agree.  I have made changes to the body of the note wherever necessary and appropriate.\par

## 2022-07-15 NOTE — HISTORY OF PRESENT ILLNESS
[de-identified] : Ms. YORK is a 37 year female c/o right ear echo and jaw pain 2 weeks ago, now right ear bubbling sensation + tinnitus on right side as well, + nasal congestion b/l during recent pregnancy and it has continued, but is slowly improving. \par \par denies otalgia, otorrhea, vertigo, hearing loss.\par denies dysphagia, dyspnea or dysphonia\par \par \par

## 2022-07-15 NOTE — ASSESSMENT
[FreeTextEntry1] : Ms. YORK is a 37 year female with two week h/o right ear clogging and tinnitus. On exam found to have bilateral cerumen which was removed today. Nose exam +DNS left and turb hypertrophy. \par \par - Audio done today wnl, no eustachian tube dysfunction seen.  D/w her- given that she had severe rhinitis of pregnancy with rapid defervescence I wonder if she is having some intermittent patulous eustachian tube disease, likely to improve with time. \par - rx Flonase use 4-6 weeks daily as her nasal congestion is still resolving.  Advised to dc if she feels the ear is worsening. \par - educated on appropriate use including daily use and spraying left nostril with right hand and vice versa\par \par \par f/up prn \par \par

## 2022-08-02 NOTE — OB RN PATIENT PROFILE - NS PRO ABUSE SCREEN AFRAID ANYONE YN
DOS: 8/4/2022.  Patient to report to Same Day Surgery.  Pre-procedure interview was completed. Patient was instructed to take the following medications on the day of surgery: tylenol (prn).    Patient was informed of current visitation policies due to coronavirus precautions.  Patient/visitor will be screened upon entry to hospital and are required to wear a mask.Patient also advised that [employer] parking is not available at this time.    COVID Test was done on 8/1 with results: negative.   no

## 2022-10-03 NOTE — OB RN INTRAOPERATIVE NOTE - NS_ANESTHESIATYPE_OBGYN_ALL_OB
Patient : Katiuska Fernandes Age: 27 year old Sex: female   MRN: [id number]  Encounter Date: 10/3/2022     History of Present Illness     Chief Complaint   Patient presents with   • Fever 9 Weeks to 74 years          • Dizziness      Arrival Mode: Self    HPI   27-year-old female, presents to the ED complaining of right ear pain and dizziness.  She reports that her right ear started hurting Saturday.  There was no injury.  She thinks she had a fever last night, felt warm, did not check her temp.  Today, she noted some dizziness, described as a movement sensation with sudden head movements.  She was concerned so decided to come here for further evaluation.  She denies using Q-tips.  She has never had this before.  She does report a history of ear infections in the past although it has been several years.  She notes that she had COVID 2 months ago, status post vaccine.  No sick contacts at home.  Significant past medical history.  No alcohol, tobacco or drugs.    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    •  SECTION, LOW TRANSVERSE      x 3   • FOOT SURGERY Left     x2      Family History   Problem Relation Age of Onset   • Asthma Brother    • Cancer Paternal Grandfather         Heart   • Asthma Son    • * Neg Hx         HTN, DM, CVA, Early MI, Cancer      Social History     Tobacco Use   • Smoking status: Never Smoker   • Smokeless tobacco: Never Used   Vaping Use   • Vaping Use: never used   Substance Use Topics   • Alcohol use: No   • Drug use: No      E-cigarette/Vaping   • E-Cigarette/Vaping Use Never Used      E-Cigarette/Vaping Substances & Devices        No Known  Allergies   Discharge Medication List as of 10/3/2022 10:37 AM      Prior to Admission Medications    Details   cetirizine (ZyrTEC) 10 MG tablet TAKE 1 CAPSULE BY MOUTH TWICE DAILYEprescribe, Disp-180 tablet, R-3**Patient requests 90 days supply**      Banophen 25 MG capsule Take 25 mg by mouth every 6 hours as needed.Historical Med, MISBAH      predniSONE (DELTASONE) 20 MG tablet Take 2 tablets by mouth daily. Do not start before August 24, 2022.Eprescribe, Disp-8 tablet, R-0      diphenhydrAMINE (Benadryl Allergy) 25 MG tablet Take 2 tablets by mouth every 6 hours as needed for Itching.Eprescribe, Disp-30 tablet, R-0      ferrous sulfate 325 (65 FE) MG tablet Take 1 tablet by mouth 2 times daily.Eprescribe, Disp-90 tablet, R-1         New Prescriptions    Details   neomycin-polymyxin-hydroCORTisone (CORTISPORIN) 3.5-86162-5 otic suspension Place 3 drops into right ear 4 times daily.Eprescribe, Disp-10 mL, R-0      amoxicillin-clavulanate (Augmentin) 875-125 MG per tablet Take 1 tablet by mouth every 12 hours for 7 days.Eprescribe, Disp-14 tablet, R-0              Review Of Systems   ROS   The following systems have been reviewed and are negative except as noted in the HPI: Constitutional reviewed; Skin reviewed; ENT reviewed; Respiratory reviewed; Cardiovascular reviewed; Gastrointestinal reviewed; Musculoskeletal reviewed; Neurologic reviewed; Psychiatric reviewed      Physical Exam     ED Triage Vitals [10/03/22 0930]   ED Triage Vitals Group      Temp 98.6 °F (37 °C)      Heart Rate (S) (!) 121      Resp       /72      SpO2 100 %      EtCO2 mmHg       Height       Weight 120 lb (54.4 kg)      Weight Scale Used ED Estimate      BMI (Calculated)       IBW/kg (Calculated)       Physical Exam   General: Alert, Verbal, No pain distress, No respiratory distress  Skin: Warm, Dry  Head: Normocephalic, Atraumatic  Eye: EOMI, Normal conjunctiva, no nystagmus  ENT: R ear canal erythematous and tender to manipulation,  Epidural right TM bulging and erythematous  Neck: Supple, Trachea midline  Cardiovascular: Regular rate, rhythm; No murmur; Normal peripheral perfusion  Respiratory: Non-labored respirations; BS equal; Symmetrical expansion; Breath sounds are clear bilaterally, no rales present, no rhonchi present, no wheezes present  Gastrointestinal: Soft; Non-tender; Non distended; No guarding, No rebound, No masses  Neurological: No focal neuro deficits, Normal motor, No facial droop, Tongue is midline, Speech normal, no ataxia  Psychiatric: Cooperative, Appropriate mood & affect      ED Course   Lab Results  No results found for this visit on 10/03/22.     EKG Results      Radiology Results  Imaging Results    None          Medications  ED Medication Orders (From admission, onward)    None            Procedures   Procedures     Medical Decision Making   MDM      The patient was seen and evaluated in the emergency department. Multiple differential diagnoses were considered.    Her evaluation appears consistent with otitis media and otitis externa.  There may also be a component of labyrinthitis.  She was noted to be tachycardic on arrival but this improved.  She is nontoxic appearing.  She can be started on antibiotics and will follow up with ENT.  Advised on further care and follow-up, discharged in stable condition.      Clinical Impression     ED Diagnosis   1. Serous labyrinthitis of right ear  SERVICE TO ENT   2. Right acute serous otitis media, recurrence not specified  SERVICE TO ENT   3. Acute otitis externa of right ear, unspecified type          Disposition      Discharge 10/3/2022 10:36 AM      Jovana Hutchins MD  [address] [facility] [address]  [phone]    In 2 days        Discharge Medication List as of 10/3/2022 10:37 AM      START taking these medications    Details   neomycin-polymyxin-hydroCORTisone (CORTISPORIN) 3.5-42497-3 otic suspension Place 3 drops into right ear 4 times daily.Eprescribe, Disp-10 mL,  R-0      amoxicillin-clavulanate (Augmentin) 875-125 MG per tablet Take 1 tablet by mouth every 12 hours for 7 days.Eprescribe, Disp-14 tablet, R-0                MD Heriberto Galarza MD  10/03/22 1776

## 2024-08-23 NOTE — DISCHARGE NOTE OB - SWOLLEN AREA ON THE LEG THAT IS PAINFUL, RED OR HOT
Statement Selected
Thierry Teixeira  Urology  [address] [place] [address]  Phone: [phone]  Fax: [phone]  Follow Up Time: 2 weeks

## 2024-09-10 NOTE — OB RN PATIENT PROFILE - NS_DATEOFLASTVISIT_OBGYN_ALL_OB_DT
Photo Preface (Leave Blank If You Do Not Want): Photographs were obtained today
Detail Level: Zone
03-May-2022

## 2025-04-18 NOTE — OB PROVIDER H&P - HISTORY OF PRESENT ILLNESS
4/18/25 - Patient contacted re: pre-surgery labs. Marisa will come in 4/22/25 ([facility]) and have her testing completed.   Pt is a 36yo   presenting with ctx q3-4mins since 1am. Pt. denies LOF, VB. + fetal movement. Denies any complications with this pregnancy. GBS negative. EFW 2700g     OBHx:  -  term C/S (does not remember why), 8#8  - 2016 term , birth weight 8lb1oz  Gyn Hx: asymptomatic fibroids. Denies ovarian cysts, endometriosis, STDs, abnormal paps.   PMH: denies  SHx: 2010 C/S; D&C menorrhaghia; hemorrhoidectomy  Psych: denies h/o depression/anxiety  Social: denies tobacco, alcohol, drugs in pregnancy   Medications: PNV  Allergies: aspirin (anaphylaxis)